# Patient Record
Sex: MALE | Race: WHITE | Employment: FULL TIME | URBAN - METROPOLITAN AREA
[De-identification: names, ages, dates, MRNs, and addresses within clinical notes are randomized per-mention and may not be internally consistent; named-entity substitution may affect disease eponyms.]

---

## 2021-11-17 ENCOUNTER — HOSPITAL ENCOUNTER (EMERGENCY)
Facility: CLINIC | Age: 31
Discharge: PSYCHIATRIC OR SUBSTANCE USE FACILITY | End: 2021-11-19
Attending: EMERGENCY MEDICINE | Admitting: EMERGENCY MEDICINE
Payer: OTHER GOVERNMENT

## 2021-11-17 DIAGNOSIS — T44.904A: ICD-10-CM

## 2021-11-17 DIAGNOSIS — R41.0 DELIRIUM: ICD-10-CM

## 2021-11-17 PROCEDURE — 90791 PSYCH DIAGNOSTIC EVALUATION: CPT

## 2021-11-17 PROCEDURE — C9803 HOPD COVID-19 SPEC COLLECT: HCPCS

## 2021-11-17 PROCEDURE — 99285 EMERGENCY DEPT VISIT HI MDM: CPT | Mod: 25

## 2021-11-18 ENCOUNTER — TELEPHONE (OUTPATIENT)
Dept: BEHAVIORAL HEALTH | Facility: CLINIC | Age: 31
End: 2021-11-18

## 2021-11-18 LAB
ALBUMIN SERPL-MCNC: 3.9 G/DL (ref 3.4–5)
ALP SERPL-CCNC: 88 U/L (ref 40–150)
ALT SERPL W P-5'-P-CCNC: 58 U/L (ref 0–70)
AMPHETAMINES UR QL SCN: ABNORMAL
ANION GAP SERPL CALCULATED.3IONS-SCNC: 6 MMOL/L (ref 3–14)
APAP SERPL-MCNC: <2 MG/L (ref 10–30)
AST SERPL W P-5'-P-CCNC: 41 U/L (ref 0–45)
BARBITURATES UR QL: ABNORMAL
BENZODIAZ UR QL: ABNORMAL
BILIRUB DIRECT SERPL-MCNC: <0.1 MG/DL (ref 0–0.2)
BILIRUB SERPL-MCNC: 0.3 MG/DL (ref 0.2–1.3)
BUN SERPL-MCNC: 17 MG/DL (ref 7–30)
CALCIUM SERPL-MCNC: 8.9 MG/DL (ref 8.5–10.1)
CANNABINOIDS UR QL SCN: ABNORMAL
CHLORIDE BLD-SCNC: 104 MMOL/L (ref 94–109)
CK SERPL-CCNC: 603 U/L (ref 30–300)
CK SERPL-CCNC: 623 U/L (ref 30–300)
CO2 SERPL-SCNC: 29 MMOL/L (ref 20–32)
COCAINE UR QL: ABNORMAL
CREAT SERPL-MCNC: 1.15 MG/DL (ref 0.66–1.25)
ERYTHROCYTE [DISTWIDTH] IN BLOOD BY AUTOMATED COUNT: 12.3 % (ref 10–15)
ETHANOL SERPL-MCNC: <0.01 G/DL
GFR SERPL CREATININE-BSD FRML MDRD: 84 ML/MIN/1.73M2
GLUCOSE BLD-MCNC: 101 MG/DL (ref 70–99)
HCT VFR BLD AUTO: 43.6 % (ref 40–53)
HGB BLD-MCNC: 14.6 G/DL (ref 13.3–17.7)
INR PPP: 0.94 (ref 0.85–1.15)
MCH RBC QN AUTO: 31.2 PG (ref 26.5–33)
MCHC RBC AUTO-ENTMCNC: 33.5 G/DL (ref 31.5–36.5)
MCV RBC AUTO: 93 FL (ref 78–100)
OPIATES UR QL SCN: ABNORMAL
PLATELET # BLD AUTO: 213 10E3/UL (ref 150–450)
POTASSIUM BLD-SCNC: 3.5 MMOL/L (ref 3.4–5.3)
PROT SERPL-MCNC: 7.2 G/DL (ref 6.8–8.8)
RBC # BLD AUTO: 4.68 10E6/UL (ref 4.4–5.9)
SALICYLATES SERPL-MCNC: <2 MG/DL
SARS-COV-2 RNA RESP QL NAA+PROBE: NEGATIVE
SODIUM SERPL-SCNC: 139 MMOL/L (ref 133–144)
WBC # BLD AUTO: 11.7 10E3/UL (ref 4–11)

## 2021-11-18 PROCEDURE — 85610 PROTHROMBIN TIME: CPT | Performed by: EMERGENCY MEDICINE

## 2021-11-18 PROCEDURE — 250N000013 HC RX MED GY IP 250 OP 250 PS 637: Performed by: EMERGENCY MEDICINE

## 2021-11-18 PROCEDURE — 250N000011 HC RX IP 250 OP 636

## 2021-11-18 PROCEDURE — 82077 ASSAY SPEC XCP UR&BREATH IA: CPT | Performed by: EMERGENCY MEDICINE

## 2021-11-18 PROCEDURE — 82550 ASSAY OF CK (CPK): CPT | Performed by: EMERGENCY MEDICINE

## 2021-11-18 PROCEDURE — 85027 COMPLETE CBC AUTOMATED: CPT | Performed by: EMERGENCY MEDICINE

## 2021-11-18 PROCEDURE — 36415 COLL VENOUS BLD VENIPUNCTURE: CPT | Performed by: EMERGENCY MEDICINE

## 2021-11-18 PROCEDURE — 80307 DRUG TEST PRSMV CHEM ANLYZR: CPT | Performed by: EMERGENCY MEDICINE

## 2021-11-18 PROCEDURE — 96374 THER/PROPH/DIAG INJ IV PUSH: CPT

## 2021-11-18 PROCEDURE — 82248 BILIRUBIN DIRECT: CPT | Performed by: EMERGENCY MEDICINE

## 2021-11-18 PROCEDURE — 87635 SARS-COV-2 COVID-19 AMP PRB: CPT | Performed by: EMERGENCY MEDICINE

## 2021-11-18 PROCEDURE — 80143 DRUG ASSAY ACETAMINOPHEN: CPT | Performed by: EMERGENCY MEDICINE

## 2021-11-18 PROCEDURE — 80179 DRUG ASSAY SALICYLATE: CPT | Performed by: EMERGENCY MEDICINE

## 2021-11-18 PROCEDURE — 80048 BASIC METABOLIC PNL TOTAL CA: CPT | Performed by: EMERGENCY MEDICINE

## 2021-11-18 RX ORDER — LORAZEPAM 2 MG/ML
2 INJECTION INTRAMUSCULAR ONCE
Status: COMPLETED | OUTPATIENT
Start: 2021-11-18 | End: 2021-11-18

## 2021-11-18 RX ORDER — OLANZAPINE 10 MG/2ML
INJECTION, POWDER, FOR SOLUTION INTRAMUSCULAR
Status: COMPLETED
Start: 2021-11-18 | End: 2021-11-19

## 2021-11-18 RX ORDER — OLANZAPINE 10 MG/2ML
10 INJECTION, POWDER, FOR SOLUTION INTRAMUSCULAR
Status: COMPLETED | OUTPATIENT
Start: 2021-11-18 | End: 2021-11-19

## 2021-11-18 RX ORDER — OLANZAPINE 5 MG/1
10 TABLET, ORALLY DISINTEGRATING ORAL 2 TIMES DAILY PRN
Status: DISCONTINUED | OUTPATIENT
Start: 2021-11-18 | End: 2021-11-19 | Stop reason: HOSPADM

## 2021-11-18 RX ORDER — ARIPIPRAZOLE 10 MG/1
10 TABLET ORAL DAILY
Status: ON HOLD | COMMUNITY
End: 2021-11-23

## 2021-11-18 RX ORDER — LORAZEPAM 2 MG/ML
INJECTION INTRAMUSCULAR
Status: COMPLETED
Start: 2021-11-18 | End: 2021-11-18

## 2021-11-18 RX ADMIN — LORAZEPAM 2 MG: 2 INJECTION INTRAMUSCULAR; INTRAVENOUS at 04:39

## 2021-11-18 RX ADMIN — OLANZAPINE 10 MG: 5 TABLET, ORALLY DISINTEGRATING ORAL at 16:34

## 2021-11-18 RX ADMIN — OLANZAPINE 15 MG: 10 TABLET, FILM COATED ORAL at 00:17

## 2021-11-18 RX ADMIN — LORAZEPAM 2 MG: 2 INJECTION INTRAMUSCULAR at 04:39

## 2021-11-18 ASSESSMENT — MIFFLIN-ST. JEOR: SCORE: 1900.19

## 2021-11-18 NOTE — TELEPHONE ENCOUNTER
S:  Pt is a 31 yr old male in the [facility] ED brought in Via EMS due to psychotic features and aggression.    B:  Info per LAURIE Sawant  :  Pt was brought to the ED via EMS after getting aggressive at a party.  He was seen taking some pills, which he said was his Adderall, then getting aggressive and pulling a knife..  He needed to be restrained and given ketamine.  In the ED he was aggressive and confused.  He was talking gibberish rapid speech.  Last night and today he has gotten threatening remains somewhat confused, disorganized., somewhat suspicious.  He did cooperate with the assessment.  He reports bipolar illness.  He states he is somewhat confused about taking his meds and has questionable compliance.    He states he has not been sleeping much and doesn't feel the need to do so.  Pt repots he doesn't remember much about last night.  He states he was in the [employer] and got out about 3 mo ago.  He says he has been having some SI but denies it currently.  Labile mood.         [facility] repots [facility]   He has elevated CK, but has been medically cleared per the ED MD.    Please see chart for further info.    Patient cleared and ready for behavioral bed placement: Yes     A:  Needing hospitalization for safety and stabilization.      [facility], [phone], spoke with Janes @ [phone], Janes will forward our request and they should CB within an hr.   3:45, Radha from the [facility] called, [phone], pt has hx there and an active care team.  They have beds and request we fax clinical to [phone]    Request was made to his RN to please check to see if he would prefer to go there.    Pt is on a 72 HR HOLD for Emergency Admission    R:  Placed on work list   Seeking appropriate placement.      [facility] reviewing

## 2021-11-18 NOTE — ED PROVIDER NOTES
[facility] ED Behavioral Health Handoff Note:       Brief HPI:  This is a 31 year old male signed out to me by Dr. Mitchell .  See initial ED Provider note for details of the presentation.     Patient is not medically cleared for admission to a Behavioral Health unit.      Pending studies: Additional labs.      Hold Status:  Active Orders   Legal    Emergency Hospitalization Hold (72 Hr Hold)     Frequency: Effective Now     Start Date/Time: 11/18/21 1128      Number of Occurrences: Until Specified    Health Officer Authority to Detain (KASIE)     Frequency: Effective Now     Start Date/Time: 11/18/21 0002      Number of Occurrences: Until Specified     Order Comments: This patient presented with an altered mental state that is suspected to be due to an intoxicating substance. The level of mentation is such that abuse of this substance is suspected. Given the patient's level of alteration and the circumstances in which the patient presented, it is likely that the patient utilizes intoxicating substances on a regular basis or has relapsed into utilizing them after a period of attempted sobriety. Given these circumstances, I am highly concerned that the patient has a problem with chemical dependency and cannot make safe decisions at this time. Currently, this endangers the patient's well-being and safety, and I am thus placing a Health Officer Authority hold upon the patient at this time.         Exam:   Temp:  [98.9  F (37.2  C)] 98.9  F (37.2  C)  Pulse:  [] 79  Resp:  [8-26] 14  BP: (150-180)/() 180/104  SpO2:  [97 %-99 %] 97 %    Slurred speech  Ambulatory independently with stead/stable gait  Agitated, reaching for security staff/ aggressive positioning noted towards nursing    ED Course:    Medications   OLANZapine zydis (zyPREXA) ODT tab 10 mg (has no administration in time range)   OLANZapine (zyPREXA) tablet 15 mg (15 mg Oral Given 11/18/21 0017)   LORazepam (ATIVAN) injection 2 mg (2 mg  "Intravenous Given 11/18/21 0439)     7:32 AM:  Code 21 called.  Nursing staff in room to draw labs.  Patient had been sleeping though awoke, and made threatening gestures towards nursing staff and attempted to exit room.  Patient was able to be verbally re-directed and went back down to sleep on [facility].  I spoke with the patient, who drifted off to sleep in conversation.  Will continue to monitor symptoms closely.    9:05 AM: Patient sleeping comfortably on gurney    11:30 AM: Patient remains sleeping    12:30 PM: Patient is becoming more awake and alert.  I did meet with the patient, and obtain collateral information.  Patient reports he had previously been in the [employer] before getting out in 2018.  Yesterday evening, he left work, planning to meet up with a friend named Jesus (former [employer]).  He reports they had a good discussion, and ultimately decided to \"have a good time.\"  Patient reports they went to Jesus's home, and Jesus acknowledged that he had a wife and children.  Patient reports that Jesus seemed frantic, trying to rapidly clean his home, and was concerned that belly felt nervous with him there.  He reports they smoked weed in the garage, the patient denies any other drug use.  He is unsure exactly of what happened the remainder of the evening, and is not sure what prompted EMS to be called.      Here in the ER, patient denies thoughts of self-harm nor suicide.  He reports his episode last evening felt similar and worse than previous manic episodes.  He reports he is on Abilify.  [facility].    Patient at this time is agreeable to redrawing laboratory studies.  Will need follow-up of his elevated CK as well as Tylenol and salicylate levels.  Patient then will require reevaluation by DEC to assist with ultimate disposition planning.    There were significant events while under my care.  (See above).    Patient was signed out to the oncoming provider, Dr. Vázquez who will " follow-up on labs and reassessment.      Impression:    ICD-10-CM    1. Delirium - likely drug induced  R41.0    2. Overdose of sympathomimetic agent, undetermined intent, initial encounter  T44.904A        Plan:    1. Pending      RESULTS:   Results for orders placed or performed during the hospital encounter of 11/17/21 (from the past 24 hour(s))   Basic metabolic panel (BMP)     Status: Abnormal    Collection Time: 11/18/21 12:18 AM   Result Value Ref Range    Sodium 139 133 - 144 mmol/L    Potassium 3.5 3.4 - 5.3 mmol/L    Chloride 104 94 - 109 mmol/L    Carbon Dioxide (CO2) 29 20 - 32 mmol/L    Anion Gap 6 3 - 14 mmol/L    Urea Nitrogen 17 7 - 30 mg/dL    Creatinine 1.15 0.66 - 1.25 mg/dL    Calcium 8.9 8.5 - 10.1 mg/dL    Glucose 101 (H) 70 - 99 mg/dL    GFR Estimate 84 >60 mL/min/1.73m2   CBC (platelets, no diff)     Status: Abnormal    Collection Time: 11/18/21 12:18 AM   Result Value Ref Range    WBC Count 11.7 (H) 4.0 - 11.0 10e3/uL    RBC Count 4.68 4.40 - 5.90 10e6/uL    Hemoglobin 14.6 13.3 - 17.7 g/dL    Hematocrit 43.6 40.0 - 53.0 %    MCV 93 78 - 100 fL    MCH 31.2 26.5 - 33.0 pg    MCHC 33.5 31.5 - 36.5 g/dL    RDW 12.3 10.0 - 15.0 %    Platelet Count 213 150 - 450 10e3/uL   CK total     Status: Abnormal    Collection Time: 11/18/21 12:18 AM   Result Value Ref Range     (H) 30 - 300 U/L   Alcohol level blood     Status: Normal    Collection Time: 11/18/21 12:18 AM   Result Value Ref Range    Alcohol ethyl <0.01 <=0.01 g/dL   Hepatic panel     Status: Normal    Collection Time: 11/18/21 12:18 AM   Result Value Ref Range    Bilirubin Total 0.3 0.2 - 1.3 mg/dL    Bilirubin Direct <0.1 0.0 - 0.2 mg/dL    Protein Total 7.2 6.8 - 8.8 g/dL    Albumin 3.9 3.4 - 5.0 g/dL    Alkaline Phosphatase 88 40 - 150 U/L    AST 41 0 - 45 U/L    ALT 58 0 - 70 U/L   Urine Drugs of Abuse Screen     Status: Abnormal    Collection Time: 11/18/21 12:54 AM    Narrative    The following orders were created for panel  order Urine Drugs of Abuse Screen.  Procedure                               Abnormality         Status                     ---------                               -----------         ------                     Drug abuse screen 1 urin...[[id number]]  Abnormal            Final result                 Please view results for these tests on the individual orders.   Drug abuse screen 1 urine (ED)     Status: Abnormal    Collection Time: 11/18/21 12:54 AM   Result Value Ref Range    Amphetamines Urine Screen Positive (A) Screen Negative    Barbiturates Urine Screen Negative Screen Negative    Benzodiazepines Urine Screen Negative Screen Negative    Cannabinoids Urine Screen Negative Screen Negative    Cocaine Urine Screen Negative Screen Negative    Opiates Urine Screen Negative Screen Negative             MD Brandon Mcneil, Tesfaye Freed MD  11/18/21 3102

## 2021-11-18 NOTE — ED NOTES
After 1000cc normal saline. Heart rate down to 100. [facility]. Pt still confused but getting better.

## 2021-11-18 NOTE — ED NOTES
"Patient up to the bathroom and started becoming increasingly agitated.  States he cannot be held here against his will and threw an object at the wall.  Dr. Lerner came to talk to patient and patient continued to be agitated.  Patient banging on the door and placed in seclusion with MD approval.  Patient moving bed around his room stating \"if you treat me like an animal I will act like an animal.\"   "

## 2021-11-18 NOTE — ED NOTES
Pt says he takes adderal and that was what he took at the party, pt says he doesn't remember much after that

## 2021-11-18 NOTE — ED NOTES
11/17/2021  Robert J Kreyer 1990       [facility] Crisis Assessment:    Started at:  1:52pm  Completed at: 2:37pm  Patient was assessed via virtually ([employer] cart or other teleconferencing device).    Chief Complaint and History of Presenting Problem:    Patient is a 31 year old  male who presented to the ED by Medics related to concerns for mental health issues. The patient was seen today by the [facility] ([facility]), through virtual crisis assessment.      The patient is calm, cooperative, alert, and oriented x3. He seems suspicious, mistrustful, and slightly confused. He demonstrates slight labile mood as he goes from irritability to tearing up. He admits feeling confusion and not understanding how he arrived in the ED. He indicates he had blacked out last night. Endorses suicidal ideation but no plan. Patient has recently not felt the need for sleep. He is uncertain if he is taking his medications as prescribed. He denies having auditory or visual hallucinations. Denies homicidal ideation. Denies use of alcohol or illicit drugs.    Patient was at a party last night and was seen taking pills. He was observed to be acting strangely which prompted them to call police. Patient was agitated and aggressive and needed to be placed in restraints. EMS needed to give him ketamine IM. He is prescribed adderal which he admits he took last night. Patient indicates he might have a history of bipolar disorder and is from [place] and  of the [employer]. He left the [employer] about 6 months ago. Medical records indicate he was released from the [place] in 2018. Patient is not considered to be an accurate historian.    Assessment and intervention involved meeting with pt, obtaining collateral information from [facility] and [facility] Everywhere records, employing crisis psychotherapy including: Establishing rapport, Active listening and Assess dimensions of crisis. Collateral information includes consulted with the ED  provider and nursing. Patient was uncertain about having DEC contact his family and indicated he didn't want to alarm them.    Biopsychosocial Background and Demographic Information    Patient is a  of the [employer] and has been out for about 6 months. He reports to have supportive parents and a sister. He was unable to provide other relevant history.    Mental Health History and Current Symptoms     Mental Health History (prior psychiatric hospitalizations, civil commitments, programmatic care, etc): He endorses previous history of bipolar disorder and at least 1 psychiatric hospitalization which occurred about 3 years ago. He is unable to provide other relevant history regarding his mental health treatments. He was confused about which medications he is taking but mentions Adderall, Depakote, Lithium, and Clonazapm. He indicates [facility] and he is having difficulty understanding or managing his medications.    Family Mental and Chemical Health History: unknown    Current and Historic Psychotropic Medications:  By patient report, Lithium, Depakote, Adderall, Clonazapam       Medication Adherent: unknown    Recent medication changes? No, he mentions he did see his psychiatric provider a few weeks ago.    Relevant Medical Concerns  Patient identifies concerns with completing ADLs? No  Patient can ambulate independently? Yes  Other medical health concerns? No  History of concussion or TBI? No     Trauma History   Physical, Emotional, or Sexual abuse: No  Loss of a friend or family member to suicide: No  Other identified traumatic event or significant stressor: No    Substance Use History and Treatments  Denies history of substance abuse or the need for treatments.    Drug screen/BAL/Breathalyzer Completed (results)?  Pos for amphetamine    History of Suicidal Ideation, Suicide Attempts, Non-Suicidal Self Injury, and Risk Formulation:   Details of current SIB, SI, or SA (Plan or Intent):  He  endorses suicidal ideation but no plan    History of SI, SA, or SIB:  Denies previous SA or SIB    Risk factors:  living alone, impulsivity/recklessness and history of or current substance use.     Protective factors:   identifies reason for living, responsibilities to others (spouse, pets, children, etc.) and identification of future goals.      ESS-6  1.a. Over the past 2 weeks, have you had thoughts of killing yourself? Yes   1.b. Have you ever attempted to kill yourself and, if yes, when did this last happen? No  2. Recent or current suicide plan? No  3. Recent or current intent to act on ideation? No  4. Lifetime psychiatric hospitalization? Yes  5. Pattern of excessive substance use? Yes  6. Current irritability, agitation, or aggression? Yes  ESS-6 Score:  Moderate Risk      Other Risk Areas  Aggressive/assumptive/homicidal risk factors: Yes: Agitation / Hyperactivity and Impaired Self Control. This was last night and earlier today in the ED.   Sexually inappropriate behavior? No      Vulnerability to sexual exploitation? No     Clinical Presentation and Current Symptoms   The patient is calm, cooperative, alert, and oriented x3. He seems suspicious, mistrustful, and slightly confused. He demonstrates slight labile mood as he goes from irritability to tearing up. He admits feeling confusion and not understanding how he arrived in the ED. He indicates he had blacked out last night. Endorses suicidal ideation but no plan. Patient has recently not felt the need for sleep. He is uncertain if he is taking his medications as prescribed. He denies having auditory or visual hallucinations. Denies homicidal ideation. Denies use of alcohol or illicit drugs.    Attention, Hyperactivity, and Impulsivity: No   Anxiety:No    Behavioral Difficulties: Yes: Agitation, Anger Problems and Hostile/Aggressive   Mood Symptoms: Yes: Crying or feels like crying, Impaired concentration, Increased irritability/agitation, Psychomotor  agitation or retardation, Risky behaviors, Sleep disturbance  and Thoughts of suicide/death    Appetite: Yes: Loss of Appetite   Feeding and Eating: No  Interpersonal Functioning: No  Learning Disabilities/Cognitive/Developmental Disorders: No   General Cognitive Impairments: No  If yes, see completed Mini-Cog Assessment below.  Sleep: Yes: Difficulty falling asleep  and Difficulty staying sleep    Psychosis: No    Trauma: No       Mental Status Exam:  Affect: Labile  Appearance: Appropriate   Attention Span/Concentration: Inattentive    Eye Contact: Intense  Fund of Knowledge: Appropriate   Language /Speech Content: Fluent  Language /Speech Volume: Normal   Language /Speech Rate/Productions: Normal   Recent Memory: Poor  Remote Memory: Variable  Mood: Anxious     Orientation:   Person: Yes   Place: No  Time of Day: No   Date: No   Situation (Do they understand why they are here?): No     Psychomotor Behavior: Normal   Thought Content: Clear  Thought Form: Intact    Current Providers and Contact Information   Patient is his own legal guardian.     Primary Care Provider: Yes, Arvin Melendrez MD  Psychiatrist: Yes, [facility]  Therapist: Yes, [facility]  : No  CTSS or ARMHS: No  ACT Team: No  Other: No    Has an SHALOM been signed? Yes ;  By: Robert Kreyer    Clinical Summary and Recommendations    Clinical summary of assessment (strengths, vulnerabilities, resources, utilization of coping skills):  Patient is recommended for admission on behavioral health unit for safety, stabilization, and further evaluation of mental health risk. He demonstrates impaired ability to insight and decision making. He is unable to safely care for himself in the community. DEC discussed the disposition plan with the ED provider and provider is in agreement with plan.      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 Patient agrees with recommended level of care.     Details of final disposition include:  Referral to inpatient behavioral health and central intake     If Inpatient, is patient admitted voluntary? No, 72 hour hold   Patient aware of potential for transfer if there is not appropriate placement? Yes  Patient is willing to travel outside of the [facility] for placement? Yes   Central Intake Notified? Yes: Date: 11/18/21 Time: 2:55pm.       Safety/after care plan, patient is unable to engage in safety planning    Duration of assessment time: .75 hrs    CPT code(s) utilized: 16917, up to 74 minutes      Gopi Kenney, LICSW

## 2021-11-18 NOTE — ED PROVIDER NOTES
History     Chief Complaint:  Mental Health Problem     History limited due to delirium  The history is provided by the patient.      Robert J Kreyer is a 31 year old male who presents with altered mental status. Patient was a party this evening when someone saw him take some pills. He then was observed to be acting strangely, prompting call for the police. When the police arrived he was put in restraints for aggressive behavior and the knife was taken. EMS gave him 250 mg of ketamine IM. At bedside he states that he took some Adderall this evening. He is a  and is from [place]. At bedside he says that he does not know who he is.     Review of Systems   Unable to perform ROS: Mental status change     Allergies:  Amoxicillin    Medications:  Fluorometholone     Past Medical History:     Acne  Migraine      Past Surgical History:    Appendectomy     Family History:    CAD  Cancer    Social History:  Patient presents via ambulance  PCP: Arvin Melendrez    Physical Exam     Patient Vitals for the past 24 hrs:   BP Temp Temp src Pulse Resp SpO2 Height Weight   11/18/21 0145 -- -- -- 79 14 -- -- --   11/18/21 0130 -- -- -- 96 16 -- -- --   11/18/21 0115 -- -- -- 86 18 -- -- --   11/18/21 0100 -- -- -- 106 10 -- -- --   11/18/21 0045 -- -- -- 102 26 -- -- --   11/18/21 0035 -- -- -- 117 -- -- -- --   11/18/21 0030  180/104 -- -- 113 19 -- -- --   11/18/21 0020 -- -- --  121 12 -- -- --   11/18/21 0015  164/105 -- -- 117 11 -- -- --   11/18/21 0010 -- -- -- 117 21 -- -- --   11/18/21 0008  160/93 98.9  F (37.2  C) Oral 120 22 97 % 1.829 m (6') 90.7 kg (200 lb)   11/18/21 0000  150/90 -- -- 128 8 99 % -- --       Physical Exam  Nursing note and vitals reviewed.  Constitutional: In restraints.  HENT:   Mouth/Throat: Mucous membranes are normal.   Eyes: Pupils are equal, round, and reactive to light. Injected with horizontal nystagmus noted.  Cardiovascular: Tachycardic, regular rhythm and normal heart sounds.  No  murmur.  Pulmonary/Chest: Effort normal and breath sounds normal. No respiratory distress. No wheezes. No rales.   Abdominal: Soft. Normal appearance. No distension. There is no tenderness.   Neurological: Alert. Strength normal and symmetric.   Skin: Skin is warm and dry.    Psychiatric: Tangential and difficult to redirect. Appears to be responding to internal stimuli    Emergency Department Course   Laboratory:  Labs Ordered and Resulted from Time of ED Arrival to Time of ED Departure   BASIC METABOLIC PANEL - Abnormal       Result Value    Sodium 139      Potassium 3.5      Chloride 104      Carbon Dioxide (CO2) 29      Anion Gap 6      Urea Nitrogen 17      Creatinine 1.15      Calcium 8.9      Glucose 101 (*)     GFR Estimate 84     CBC WITH PLATELETS - Abnormal    WBC Count 11.7 (*)     RBC Count 4.68      Hemoglobin 14.6      Hematocrit 43.6      MCV 93      MCH 31.2      MCHC 33.5      RDW 12.3      Platelet Count 213     CK TOTAL - Abnormal     (*)    DRUG ABUSE SCREEN 1 URINE (ED) - Abnormal    Amphetamines Urine Screen Positive (*)     Barbiturates Urine Screen Negative      Benzodiazepines Urine Screen Negative      Cannabinoids Urine Screen Negative      Cocaine Urine Screen Negative      Opiates Urine Screen Negative     ETHYL ALCOHOL LEVEL - Normal    Alcohol ethyl <0.01       Reviewed:  I reviewed nursing notes, vitals, past medical history and Care Everywhere    Assessments/;Consults:  ED Course as of 11/18/21 0522   Wed Nov 17, 2021   5832 Obtained history and examined the patient as noted above   u Nov 18, 2021   0152 Rechecked the patient. He is asleep and will be taken out of restraints   0435 Code 21. The patient had escalated and tried to leave the room. On arrival the patient was able to be verbally deescalated. He did endorse hearing voices during this time.     Interventions:  0017 Olanzapine 15 mg, Oral   0439 Ativan 2 mg, IV    Disposition:  The patient was discharged to home  pending metabolization of his intoxicant and reassessment.     Impression & Plan     Medical Decision Making:  Robert J Kreyer is 31 year old gentleman who presents with delirium and a toxidrome that is consistent with a sympathomimetic drug. Drug screen is postiive for amphetamines which would fit this clinical scenario. His eyes are dilated, he is sweaty and tachycardic and clearly delirious. His history was complicated by his probable ingestion as well as the ketamine that was administered prior to arrival at the hospital. After zyprexa he has been sleeping and resting comfortably. He is now out of restraints. Plan of care is for reassessment in the morning once he has had a chance to metabolize his intoxicants.     Diagnosis:    ICD-10-CM    1. Delirium - likely drug induced  R41.0    2. Overdose of sympathomimetic agent, undetermined intent, initial encounter  T44.904A      Scribe Disclosure:  JAMES, Scott Lewis, am serving as a scribe at 11:52 PM on 11/17/2021 to document services personally performed by Agusto Mitchell MD based on my observations and the provider's statements to me.            Agusto Mitchell MD  11/18/21 9404

## 2021-11-18 NOTE — ED NOTES
Patient woke, stating he needed to pee. Patient banging on the door. Yelling at security and attempting to force door open. Patient continues to not be redirectable.

## 2021-11-18 NOTE — ED NOTES
Restraints removed per Dr. Mitchell at 0153. Pt calm and cooperative at this time, agrees to meet criteria to remain out of restraints. Ambulated to bathroom with out incident.

## 2021-11-18 NOTE — PHARMACY-ADMISSION MEDICATION HISTORY
Admission medication history interview status for this patient is complete. See [facility] admission navigator for allergy information, prior to admission medications and immunization status.     Medication history interview done, indicate source(s): Patient  Medication history resources (including written lists, pill bottles, clinic record):[facility]  [facility]    Med rec completed to the best of this writer's ability. Patient was able to have conversation with me but could not remember the names of the [facility]s that [facility].  He did say that he is taking Adderall XR 60 mg but it is not prescribed to him- he is getting from a friend so I am unable to verify if that is accurate or not.    Changes made to PTA medication list:  Added: all meds  Changed:   Reported as Not Taking:   Removed: FML     Actions taken by pharmacist (provider contacted, etc):sticky note- talked to RN     Additional medication history information:None    Medication reconciliation/reorder completed by provider prior to medication history?  N   (Y/N)         Prior to Admission medications    Medication Sig Last Dose Taking? Auth Provider   Amphetamine-Dextroamphetamine (AMPHETAMINE-DEXTROAMPHET ER PO) Take 60 mg by mouth every morning ? Yes Unknown, Entered By History   ARIPiprazole (ABILIFY) 10 MG tablet Take 10 mg by mouth daily ? Yes Unknown, Entered By History   Cholecalciferol (VITAMIN D3 PO) Take by mouth daily  Yes Unknown, Entered By History   Pediatric Multivit-Minerals-C (RA GUMMY VITAMINS & MINERALS PO) Take 1 tablet by mouth daily  Yes Unknown, Entered By History
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
## 2021-11-18 NOTE — SAFE
Yaron DERAS Yaaluz maria  November 18, 2021  SAFE Note    Critical Safety Issues:  Patient was agitated and aggressive last night and in the ED. He continues to be slightly disorganized with speech. He continues to demonstrate intermittent rambling. He demonstrates slight labile mood as he goes from irritability to tearing up. He admits feeling confusion and not understanding how he arrived in the ED. Endorses suicidal ideation but no plan.      Current Suicidal Ideation/Self-Injurious Concerns/Methods: Other Endorses suicidal ideation but no plan or intent.      Current or Historical Inappropriate Sexual Behavior: No      Current or Historical Aggression/Homicidal Ideation: None - N/A      Triggers:  Patient has recently not felt the need for sleep. He is uncertain if he is taking his medications as prescribed.     Updated care team: Yes: consulted with ED provider and central intake    For additional details see full LMHP assessment.       DUKE NewsomeSW

## 2021-11-18 NOTE — ED NOTES
Called code 21 pt aggressively trying to get out of room, admits to hearing voices. Pt had foot in doorway and wouldn't get back in room .. given ativan threw iv, everything has been removed from room

## 2021-11-18 NOTE — ED NOTES
Security was brought in to assist as pt was at door and seemingly agitated after pulling out his own IV. Pt was able to be descalated and became cooperative. Pt allowed nurse to take blood. Food was ordered and eaten. Pt spoke with doctor calmly. Pt agrees to remain calm and seclusion restraint lifted.

## 2021-11-18 NOTE — ED NOTES
Attempted to wake patient for a blood draw. When patient woke up, after a few seconds I explained to the patient who I was and why I was here. I started to attempt to draw blood and patient grabbed my hand. I was able to pull out of this quickly. His eyes were wide open and looked frantic. I explained to patient why I was here again and he seemed to not listen to any of it. He balled up his other fist got up and was posturing. I moved to the door with security to talk to him. He continued posturing, rambling about this that were not mention. Patient was not able to be redirected. He was hitting the door frame. I Was able to get the patient back to bed when I notified him that I would get the doctor to talk to him. He took a few steps back as if he was going to cooperate then came back forward and slammed into the the door barring it open. Patient was again threatening the safety of staff and help had not arrived yet. At this time he started coming at staff when a taser was presented by security and was mandated to step back and get into bed. At this time staff had arrived for protection. MD talked with patient. Patient fell asleep while being talked to. Seclusion order given at this time for safety. Unable to get labs at this time

## 2021-11-19 ENCOUNTER — HOSPITAL ENCOUNTER (INPATIENT)
Facility: CLINIC | Age: 31
LOS: 4 days | Discharge: HOME | DRG: 885 | End: 2021-11-23
Attending: PSYCHIATRY & NEUROLOGY | Admitting: PSYCHIATRY & NEUROLOGY
Payer: OTHER GOVERNMENT

## 2021-11-19 VITALS
SYSTOLIC BLOOD PRESSURE: 129 MMHG | TEMPERATURE: 98.9 F | OXYGEN SATURATION: 98 % | BODY MASS INDEX: 27.09 KG/M2 | DIASTOLIC BLOOD PRESSURE: 84 MMHG | RESPIRATION RATE: 18 BRPM | HEIGHT: 72 IN | HEART RATE: 88 BPM | WEIGHT: 200 LBS

## 2021-11-19 DIAGNOSIS — F23 BRIEF PSYCHOTIC DISORDER (H): Primary | ICD-10-CM

## 2021-11-19 DIAGNOSIS — H04.123 DRY EYES: ICD-10-CM

## 2021-11-19 PROBLEM — F29 PSYCHOSIS (H): Status: ACTIVE | Noted: 2021-11-19

## 2021-11-19 PROCEDURE — 96372 THER/PROPH/DIAG INJ SC/IM: CPT | Mod: 59

## 2021-11-19 PROCEDURE — 250N000013 HC RX MED GY IP 250 OP 250 PS 637: Performed by: EMERGENCY MEDICINE

## 2021-11-19 PROCEDURE — 128N000001 HC R&B CD/MH ADULT

## 2021-11-19 PROCEDURE — 250N000011 HC RX IP 250 OP 636

## 2021-11-19 RX ORDER — OLANZAPINE 10 MG/2ML
10 INJECTION, POWDER, FOR SOLUTION INTRAMUSCULAR 3 TIMES DAILY PRN
Status: DISCONTINUED | OUTPATIENT
Start: 2021-11-19 | End: 2021-11-23 | Stop reason: HOSPADM

## 2021-11-19 RX ORDER — OLANZAPINE 10 MG/2ML
10 INJECTION, POWDER, FOR SOLUTION INTRAMUSCULAR ONCE
Status: DISCONTINUED | OUTPATIENT
Start: 2021-11-19 | End: 2021-11-19 | Stop reason: HOSPADM

## 2021-11-19 RX ORDER — ACETAMINOPHEN 325 MG/1
650 TABLET ORAL EVERY 4 HOURS PRN
Status: DISCONTINUED | OUTPATIENT
Start: 2021-11-19 | End: 2021-11-23 | Stop reason: HOSPADM

## 2021-11-19 RX ORDER — AMOXICILLIN 250 MG
1 CAPSULE ORAL 2 TIMES DAILY PRN
Status: DISCONTINUED | OUTPATIENT
Start: 2021-11-19 | End: 2021-11-23 | Stop reason: HOSPADM

## 2021-11-19 RX ORDER — OLANZAPINE 10 MG/1
10 TABLET ORAL 3 TIMES DAILY PRN
Status: DISCONTINUED | OUTPATIENT
Start: 2021-11-19 | End: 2021-11-23 | Stop reason: HOSPADM

## 2021-11-19 RX ORDER — ARIPIPRAZOLE 10 MG/1
10 TABLET ORAL DAILY
Status: DISCONTINUED | OUTPATIENT
Start: 2021-11-19 | End: 2021-11-19 | Stop reason: HOSPADM

## 2021-11-19 RX ORDER — MAGNESIUM HYDROXIDE/ALUMINUM HYDROXICE/SIMETHICONE 120; 1200; 1200 MG/30ML; MG/30ML; MG/30ML
30 SUSPENSION ORAL EVERY 4 HOURS PRN
Status: DISCONTINUED | OUTPATIENT
Start: 2021-11-19 | End: 2021-11-23 | Stop reason: HOSPADM

## 2021-11-19 RX ORDER — HYDROXYZINE HYDROCHLORIDE 25 MG/1
25 TABLET, FILM COATED ORAL EVERY 4 HOURS PRN
Status: DISCONTINUED | OUTPATIENT
Start: 2021-11-19 | End: 2021-11-23 | Stop reason: HOSPADM

## 2021-11-19 RX ORDER — TRAZODONE HYDROCHLORIDE 50 MG/1
50 TABLET, FILM COATED ORAL
Status: DISCONTINUED | OUTPATIENT
Start: 2021-11-19 | End: 2021-11-23 | Stop reason: HOSPADM

## 2021-11-19 RX ADMIN — OLANZAPINE 10 MG: 10 INJECTION, POWDER, FOR SOLUTION INTRAMUSCULAR at 00:47

## 2021-11-19 RX ADMIN — OLANZAPINE 10 MG: 5 TABLET, ORALLY DISINTEGRATING ORAL at 19:01

## 2021-11-19 RX ADMIN — ARIPIPRAZOLE 10 MG: 10 TABLET ORAL at 09:36

## 2021-11-19 ASSESSMENT — ACTIVITIES OF DAILY LIVING (ADL)
DIFFICULTY_COMMUNICATING: NO
HYGIENE/GROOMING: INDEPENDENT
DRESS: SCRUBS (BEHAVIORAL HEALTH)
FALL_HISTORY_WITHIN_LAST_SIX_MONTHS: NO
WEAR_GLASSES_OR_BLIND: NO
ORAL_HYGIENE: INDEPENDENT
HEARING_DIFFICULTY_OR_DEAF: NO
DIFFICULTY_EATING/SWALLOWING: NO
TOILETING_ISSUES: NO
DOING_ERRANDS_INDEPENDENTLY_DIFFICULTY: YES
WALKING_OR_CLIMBING_STAIRS_DIFFICULTY: NO
DRESSING/BATHING_DIFFICULTY: NO
PATIENT_/_FAMILY_COMMUNICATION_STYLE: SPOKEN LANGUAGE (ENGLISH OR BILINGUAL)
CONCENTRATING,_REMEMBERING_OR_MAKING_DECISIONS_DIFFICULTY: NO

## 2021-11-19 NOTE — ED PROVIDER NOTES
[facility] ED Behavioral Health Handoff Note:       Brief HPI:  This is a 31 year old male signed out to me by Dr. Hart .  See initial ED Provider note for details of the presentation.     Patient is medically cleared for admission to a Behavioral Health unit.      Pending studies:NA.      Hold Status:  Active Orders   Legal    Emergency Hospitalization Hold (72 Hr Hold)     Frequency: Effective Now     Start Date/Time: 11/18/21 1128      Number of Occurrences: Until Specified    Health Officer Authority to Detain (KASIE)     Frequency: Effective Now     Start Date/Time: 11/18/21 0002      Number of Occurrences: Until Specified     Order Comments: This patient presented with an altered mental state that is suspected to be due to an intoxicating substance. The level of mentation is such that abuse of this substance is suspected. Given the patient's level of alteration and the circumstances in which the patient presented, it is likely that the patient utilizes intoxicating substances on a regular basis or has relapsed into utilizing them after a period of attempted sobriety. Given these circumstances, I am highly concerned that the patient has a problem with chemical dependency and cannot make safe decisions at this time. Currently, this endangers the patient's well-being and safety, and I am thus placing a Health Officer Authority hold upon the patient at this time.         The patient has required medication for agitation.    The patient's home medications have been reviewed and ordered/administered.    Exam:   Pulse:  [82] 82  Resp:  [16] 16  BP: (141)/(79) 141/79  SpO2:  [98 %] 98 %    Intermittently agitated  Ambulatory independently to the bathroom    ED Course:    Medications   OLANZapine zydis (zyPREXA) ODT tab 10 mg (10 mg Oral Given 11/18/21 1634)   ARIPiprazole (ABILIFY) tablet 10 mg (has no administration in time range)   OLANZapine (zyPREXA) injection 10 mg (has no administration in time range)    OLANZapine (zyPREXA) tablet 15 mg (15 mg Oral Given 11/18/21 0017)   LORazepam (ATIVAN) injection 2 mg (2 mg Intravenous Given 11/18/21 0439)   OLANZapine (zyPREXA) injection 10 mg (10 mg Intramuscular Given 11/19/21 0047)       There were no significant events while under my care.      Reached out to Dr. Jin from psychiatry regarding patient's clinical stay.  Awaiting recommendations.    Patient was signed out to the oncoming provider. Dr. Crain    Impression:    ICD-10-CM    1. Delirium - likely drug induced  R41.0    2. Overdose of sympathomimetic agent, undetermined intent, initial encounter  T44.904A        Plan:    1. Await Transfer to Mental Health Facility      RESULTS:   Results for orders placed or performed during the hospital encounter of 11/17/21 (from the past 24 hour(s))   Salicylate level     Status: Normal    Collection Time: 11/18/21  1:02 PM   Result Value Ref Range    Salicylate <2 <20 mg/dL   Acetaminophen level     Status: Abnormal    Collection Time: 11/18/21  1:02 PM   Result Value Ref Range    Acetaminophen <2 (L) 10 - 30 mg/L   INR     Status: Normal    Collection Time: 11/18/21  1:02 PM   Result Value Ref Range    INR 0.94 0.85 - 1.15   CK total     Status: Abnormal    Collection Time: 11/18/21  1:02 PM   Result Value Ref Range     (H) 30 - 300 U/L   Asymptomatic COVID-19 Virus (Coronavirus) by PCR Nasopharyngeal     Status: Normal    Collection Time: 11/18/21  4:41 PM    Specimen: Nasopharyngeal; Swab   Result Value Ref Range    SARS CoV2 PCR Negative Negative    Narrative    Testing was performed using the sandrine  SARS-CoV-2 & Influenza A/B Assay on the sandrine  Medina  System.  This test should be ordered for the detection of SARS-COV-2 in individuals who meet SARS-CoV-2 clinical and/or epidemiological criteria. Test performance is unknown in asymptomatic patients.  This test is for in vitro diagnostic use under the FDA EUA for laboratories certified under CLIA to perform moderate  and/or high complexity testing. This test has not been FDA cleared or approved.  A negative test does not rule out the presence of PCR inhibitors in the specimen or target RNA in concentration below the limit of detection for the assay. The possibility of a false negative should be considered if the patient's recent exposure or clinical presentation suggests COVID-19.  [facility] are certified under the Clinical Laboratory Improvement Amendments of 1988 (CLIA-88) as qualified to perform moderate and/or high complexity laboratory testing.             MD Brandon Mcneil, Tesfaye Freed MD  11/19/21 1300

## 2021-11-19 NOTE — ED NOTES
"Pt came out, wanted to \"go for a walk\". Pt kept trying to walk past security. Security had to hold patient, Code 21 called. Patient walked himself back to his room.   "

## 2021-11-19 NOTE — ED NOTES
Pt in seclusion upon arrival due to agitation and violent behavior towards staff. Pt being watched by security. Pt seen laying in bed, moving head.

## 2021-11-19 NOTE — ED PROVIDER NOTES
[facility] ED Mental Health Handoff Note:       Brief HPI:  This is a 31 year old male signed out to me by Dr. Taylor via Dr. Vázquez.  See initial ED Provider note for full details of the presentation.  Plan at time of signout was awaiting evaluation by DEC/     Home meds reviewed and ordered/administered: No    Medically stable for inpatient mental health admission: Yes.    Evaluated by mental health: No. Patient is clinically sober and awaiting evaluation for disposition.    Safety concerns: At the time I received sign out, the patient had been aggressive/combative/agitated, but has calmed.    Hold Status:  Active Orders   Legal    Emergency Hospitalization Hold (72 Hr Hold)     Frequency: Effective Now     Start Date/Time: 11/18/21 1128      Number of Occurrences: Until Specified    Health Officer Authority to Detain (KASIE)     Frequency: Effective Now     Start Date/Time: 11/18/21 0002      Number of Occurrences: Until Specified     Order Comments: This patient presented with an altered mental state that is suspected to be due to an intoxicating substance. The level of mentation is such that abuse of this substance is suspected. Given the patient's level of alteration and the circumstances in which the patient presented, it is likely that the patient utilizes intoxicating substances on a regular basis or has relapsed into utilizing them after a period of attempted sobriety. Given these circumstances, I am highly concerned that the patient has a problem with chemical dependency and cannot make safe decisions at this time. Currently, this endangers the patient's well-being and safety, and I am thus placing a Health Officer Authority hold upon the patient at this time.         Exam:   Patient Vitals for the past 24 hrs:   BP Temp Temp src Pulse Resp SpO2 Height Weight   11/18/21 1443 (!) 141/79 -- -- 82 16 98 % -- --   11/18/21 0145 -- -- -- 79 14 -- -- --   11/18/21 0130 -- -- -- 96 16 -- -- --   11/18/21  0115 -- -- -- 86 18 -- -- --   11/18/21 0100 -- -- -- 106 10 -- -- --   11/18/21 0045 -- -- -- 102 26 -- -- --   11/18/21 0035 -- -- -- 117 -- -- -- --   11/18/21 0030 (!) 180/104 -- -- 113 19 -- -- --   11/18/21 0020 -- -- -- (!) 121 12 -- -- --   11/18/21 0015 (!) 164/105 -- -- 117 11 -- -- --   11/18/21 0010 -- -- -- 117 21 -- -- --   11/18/21 0008 (!) 160/93 98.9  F (37.2  C) Oral 120 22 97 % 1.829 m (6') 90.7 kg (200 lb)   11/18/21 0000 (!) 150/90 -- -- (!) 128 8 99 % -- --         ED Course:    Medications   OLANZapine zydis (zyPREXA) ODT tab 10 mg (10 mg Oral Given 11/18/21 1634)   OLANZapine (zyPREXA) injection 10 mg (has no administration in time range)   OLANZapine (zyPREXA) 10 MG injection (has no administration in time range)   OLANZapine (zyPREXA) tablet 15 mg (15 mg Oral Given 11/18/21 0017)   LORazepam (ATIVAN) injection 2 mg (2 mg Intravenous Given 11/18/21 0439)       ED Course as of 11/18/21 3703   Wed Nov 17, 2021   1511 Obtained history and examined the patient as noted above   Thu Nov 18, 2021   0152 Rechecked the patient. He is asleep and will be taken out of restraints   0435 Code 21. The patient had escalated and tried to leave the room. On arrival the patient was able to be verbally deescalated. He did endorse hearing voices during this time.       There were significant events during my shift.  He became agitated and required seclusion for short time but was agreeable to taking oral Zyprexa and calm down and had no further issues on my shift.  He was evaluated by DEC, who is endorsing inpatient admission.  He is already on a 72-hour hold.  We are attempting to find placement at this time with bed search ongoing.    Patient was signed out to the oncoming provider, Dr. Hart.       Impression:    ICD-10-CM    1. Delirium - likely drug induced  R41.0    2. Overdose of sympathomimetic agent, undetermined intent, initial encounter  T44.904A        Plan:    1. Awaiting inpatient mental health  admission/transfer.    MD Eduarda Galeas Nicholas J, MD  11/18/21 4544

## 2021-11-19 NOTE — ED NOTES
Patient came to the door, RN introduced herself to the patient. Patient asking how long he has to stay here. RN explained plan of care to patient. Patient remained calm, cooperative. RN provided lunch and water to the patient.

## 2021-11-19 NOTE — ED NOTES
Seclusion discontinued, pt sleeping, MD notified, and ok with discontinuation if security feels it to be safe.

## 2021-11-19 NOTE — CONSULTS
Consult Date: 2021    PHONE CONTACT/PATIENT NONCONTACT NOTE    Contacted by the ED physician, Dr. Lerner, regarding Mr. Kreyer, who was admitted in an acute psychotic state, was evaluated by DEC and is awaiting an acute psychiatric bed.  He has been placed on a 72-hour hold.  He was restarted on his ambulatory medication Abilify 10 mg daily today, but he did require IM Zyprexa 10 mg for agitation.  I have been asked to advise regarding possible p.r.n. medications.  His urine tox screen was positive for amphetamine metabolites, but he is noted also to be on prescription amphetamines.  His blood pressure and vitals have been stable.    RECOMMENDATION:  For lorazepam 1-2 mg every 6 hours p.r.n. IM/p.o. or IV.  He likely would have better coverage from Zyprexa 5 mg and can begin 1 tablet t.i.d. p.r.n. also in addition to his usual daily Abilify.  Please call with questions and concerns.  He is being closely monitored in the ED on Observation status until transfer to Psychiatry.    Miriam Jin MD        D: 2021   T: 2021   MT: LEELEE/TALQA    Name:     KREYER, ROBERT J.  MRN:      [id number]        Account:      [id number]   :      1990           Consult Date: 2021     Document: [id number]

## 2021-11-19 NOTE — ED NOTES
Pt woke, asked for urinal and tried to get out of room. Code green called and zyprexa given in right thigh.

## 2021-11-19 NOTE — ED NOTES
"Pt provided oral care supplies and commode. Pt up in room walking around asking to go to the bathroom. Pt reminded we need him to stay in his room for safety. Pt talking fast, seeming paranoid with statements such as \"for me to trust you, will you let me go to the bathroom.\",  moving bed around in room as evidence of lowering and lifting bed and moving it into the middle of the room and back to corner. Pt willing to take medications (see MAR), pt asked for lights off, pt went back to bed.    "

## 2021-11-19 NOTE — ED NOTES
Pt up to door as he continues to be in seclusion, requesting urinal. Patient stated that he would continue to make staff as uncomfortable as possible as he is uncomfortable, until he is released. Patient does not recall being aggressive with staff.

## 2021-11-19 NOTE — TELEPHONE ENCOUNTER
R: Patient in [facility] ER awaiting placement  Tent 55c/[facility]  4:45pm: Red accepts patient to 55C/[facility]  5:20pm: Intake informed unit charge nurse of patient placement and report time, Unit charge will call [facility] er for report. Intake informed

## 2021-11-19 NOTE — ED PROVIDER NOTES
[facility] ED Mental Health Handoff Note:       Brief HPI:  This is a 31 year old male signed out to me by Dr. Lerner.  See initial ED Provider note for full details of the presentation. Patient seen by DEC who recommended admission.     Home meds reviewed and ordered/administered: Yes    Medically stable for inpatient mental health admission: Yes.    Evaluated by mental health: Yes. The recommendation is for inpatient mental health treatment. Bed search in process    Safety concerns: At the time I received sign out, the patient had been aggressive/combative/agitated, but has calmed.    Hold Status:  Active Orders   Legal    Emergency Hospitalization Hold (72 Hr Hold)     Frequency: Effective Now     Start Date/Time: 11/18/21 1128      Number of Occurrences: Until Specified    Health Officer Authority to Detain (KASIE)     Frequency: Effective Now     Start Date/Time: 11/18/21 0002      Number of Occurrences: Until Specified     Order Comments: This patient presented with an altered mental state that is suspected to be due to an intoxicating substance. The level of mentation is such that abuse of this substance is suspected. Given the patient's level of alteration and the circumstances in which the patient presented, it is likely that the patient utilizes intoxicating substances on a regular basis or has relapsed into utilizing them after a period of attempted sobriety. Given these circumstances, I am highly concerned that the patient has a problem with chemical dependency and cannot make safe decisions at this time. Currently, this endangers the patient's well-being and safety, and I am thus placing a Health Officer Authority hold upon the patient at this time.         Exam:   Patient Vitals for the past 24 hrs:   BP Pulse Resp SpO2   11/18/21 1443 (!) 141/79 82 16 98 %   11/18/21 0145 -- 79 14 --   11/18/21 0130 -- 96 16 --   11/18/21 0115 -- 86 18 --   11/18/21 0100 -- 106 10 --   11/18/21 0045 -- 102 26 --        ED Course:    Medications   OLANZapine zydis (zyPREXA) ODT tab 10 mg (10 mg Oral Given 11/18/21 1634)   ARIPiprazole (ABILIFY) tablet 10 mg (has no administration in time range)   OLANZapine (zyPREXA) injection 10 mg (has no administration in time range)   OLANZapine (zyPREXA) tablet 15 mg (15 mg Oral Given 11/18/21 0017)   LORazepam (ATIVAN) injection 2 mg (2 mg Intravenous Given 11/18/21 0439)   OLANZapine (zyPREXA) injection 10 mg (10 mg Intramuscular Given 11/19/21 0047)       ED Course as of 11/19/21 0040   Wed Nov 17, 2021   3843 Obtained history and examined the patient as noted above   Thu Nov 18, 2021   0152 Rechecked the patient. He is asleep and will be taken out of restraints   0435 Code 21. The patient had escalated and tried to leave the room. On arrival the patient was able to be verbally deescalated. He did endorse hearing voices during this time.     12:40 AM  Patient becoming increasingly agitated, threw urinal at staff.  10 IM zyprexa given.     1:45AM  Patient sleeping    Patient was signed out to the oncoming provider, Dr. Taylor      Impression:    ICD-10-CM    1. Delirium - likely drug induced  R41.0    2. Overdose of sympathomimetic agent, undetermined intent, initial encounter  T44.904A        Plan:    1. Awaiting inpatient mental health admission/transfer.      RESULTS:   Results for orders placed or performed during the hospital encounter of 11/17/21 (from the past 24 hour(s))   Urine Drugs of Abuse Screen     Status: Abnormal    Collection Time: 11/18/21 12:54 AM    Narrative    The following orders were created for panel order Urine Drugs of Abuse Screen.  Procedure                               Abnormality         Status                     ---------                               -----------         ------                     Drug abuse screen 1 urin...[[id number]]  Abnormal            Final result                 Please view results for these tests on the individual orders.   Drug  abuse screen 1 urine (ED)     Status: Abnormal    Collection Time: 11/18/21 12:54 AM   Result Value Ref Range    Amphetamines Urine Screen Positive (A) Screen Negative    Barbiturates Urine Screen Negative Screen Negative    Benzodiazepines Urine Screen Negative Screen Negative    Cannabinoids Urine Screen Negative Screen Negative    Cocaine Urine Screen Negative Screen Negative    Opiates Urine Screen Negative Screen Negative   Salicylate level     Status: Normal    Collection Time: 11/18/21  1:02 PM   Result Value Ref Range    Salicylate <2 <20 mg/dL   Acetaminophen level     Status: Abnormal    Collection Time: 11/18/21  1:02 PM   Result Value Ref Range    Acetaminophen <2 (L) 10 - 30 mg/L   INR     Status: Normal    Collection Time: 11/18/21  1:02 PM   Result Value Ref Range    INR 0.94 0.85 - 1.15   CK total     Status: Abnormal    Collection Time: 11/18/21  1:02 PM   Result Value Ref Range     (H) 30 - 300 U/L   Asymptomatic COVID-19 Virus (Coronavirus) by PCR Nasopharyngeal     Status: Normal    Collection Time: 11/18/21  4:41 PM    Specimen: Nasopharyngeal; Swab   Result Value Ref Range    SARS CoV2 PCR Negative Negative    Narrative    Testing was performed using the sandrine  SARS-CoV-2 & Influenza A/B Assay on the sandrine  [facility]ina  System.  This test should be ordered for the detection of SARS-COV-2 in individuals who meet SARS-CoV-2 clinical and/or epidemiological criteria. Test performance is unknown in asymptomatic patients.  This test is for in vitro diagnostic use under the FDA EUA for laboratories certified under CLIA to perform moderate and/or high complexity testing. This test has not been FDA cleared or approved.  A negative test does not rule out the presence of PCR inhibitors in the specimen or target RNA in concentration below the limit of detection for the assay. The possibility of a false negative should be considered if the patient's recent exposure or clinical presentation suggests COVID-19.   [facility] are certified under the Clinical Laboratory Improvement Amendments of 1988 (CLIA-88) as qualified to perform moderate and/or high complexity laboratory testing.             DO Tanner Zamarripa Lindsey E, DO  11/19/21 0447

## 2021-11-19 NOTE — ED NOTES
"Patient came to door, stated that he needed to use the bathroom (for the 4th time in the last hour). Pt got half way to the bathroom and stopped, looked back at security and stated, \"What would you do if I tried to leave?' Security stated, \" I would have to stop you.\" Pt pointed to security guards tasjason and stated, \" Would you shoot me with that thing?\"  Security stated, \"We don't want to have to do that because that would delay this process of you getting out of here.\" Pt demeanor calm. Patient given new sheets and blankets. Re-directed back to bed.   "

## 2021-11-19 NOTE — ED NOTES
"Pt given breakfast, lights turned on. ABC's intact. Pt asked about his Abilify, pt stated \"hes not sure right now.\" RN will check in after breakfast.   "

## 2021-11-19 NOTE — TELEPHONE ENCOUNTER
Update: pt declined by the [facility] due to pt being too acute for the beds they have available.  [facility] is at capacity.  Discussed pt with on call provider for [facility] who feels pt is too acute for [facility] at this time.  ED notified.

## 2021-11-20 PROCEDURE — 128N000001 HC R&B CD/MH ADULT

## 2021-11-20 PROCEDURE — 99223 1ST HOSP IP/OBS HIGH 75: CPT | Performed by: NURSE PRACTITIONER

## 2021-11-20 PROCEDURE — 250N000013 HC RX MED GY IP 250 OP 250 PS 637: Performed by: NURSE PRACTITIONER

## 2021-11-20 PROCEDURE — 250N000013 HC RX MED GY IP 250 OP 250 PS 637: Performed by: PSYCHIATRY & NEUROLOGY

## 2021-11-20 RX ORDER — ARIPIPRAZOLE 5 MG/1
5 TABLET ORAL DAILY
Status: DISCONTINUED | OUTPATIENT
Start: 2021-11-20 | End: 2021-11-21

## 2021-11-20 RX ADMIN — GUAIFENESIN 10 ML: 200 SOLUTION ORAL at 20:38

## 2021-11-20 RX ADMIN — TRAZODONE HYDROCHLORIDE 50 MG: 50 TABLET ORAL at 01:45

## 2021-11-20 RX ADMIN — ARIPIPRAZOLE 5 MG: 5 TABLET ORAL at 13:39

## 2021-11-20 RX ADMIN — GUAIFENESIN 10 ML: 200 SOLUTION ORAL at 01:45

## 2021-11-20 ASSESSMENT — ACTIVITIES OF DAILY LIVING (ADL)
HYGIENE/GROOMING: SHOWER;INDEPENDENT
DRESS: SCRUBS (BEHAVIORAL HEALTH)
ORAL_HYGIENE: INDEPENDENT

## 2021-11-20 NOTE — PROGRESS NOTES
"Pt arrived to unit via EMS from [facility]. Pt arrives to ED after he was at a party and was observed taking pills. Pt's became aggressive and pulled out a knife. 911 was called. In the ED, pt has altered mental status, is disorganizes, labile, and speaking gibberish. Pt is given Ativan IV x2 for trying elope from ED. Pt also placed in seclusion. Pt is later calm and cooperative and constantly requesting food.    Upon arrival to unit, pt is cooperative with vital signs, skin check, and changes into scrubs. Pt is tense, cutting writer off when describing the unit stating, \"I know the drill\". Medications reviewed with patient (see PTA med list). Pt is requesting Seroquel for sleep and Robitussin for a sore throat. Pt denies all psych symptoms and contracts for safety at this time. Pt denies any pain and is alert and oriented x4.    Per pt's friend, Lang, pt was discharged from the [employer], six months ago, for \"psych\". Pt has a history of Bipolar disorder.    Current medical concerns include a sore throat and elevated CK level of 623. Pt is medically cleared at [facility] ED.     Pt voices no other concerns at this time.Pt is given crackers and agrees to sign security envelope. Pt refuses to answer further questions this evening and requests to sleep.  Pt is started on suicidal, elopement, assault, and cheeking precautions. Staff will continue to monitor.    Lila Alves RN          "

## 2021-11-20 NOTE — H&P
"PSYCHIATRY   HISTORY AND PHYSICAL     DATE OF SERVICE   11/20/2021         CHIEF COMPLAINT   \"not sure exactly what happened\"       HISTORY OF PRESENT ILLNESS   This is a 31 year old male with history notable for bipolar disorder who presented to the ED via EMS due to concerns related to altered mental status. Current legal status is 72 hour hold. 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 Care coordinations performed in details includes obtaining collateral information from [facility] everywhere records. In brief, Patient reportedly took some pills while hanging out with a 'new friend' at a party and became manic, agitated and confused. Police called as he continued to be increasingly confused and agitated. Patient reportedly became more aggressive when the EMS arrived which prompted administration of ketamine as he was restrained and brought to the ER.     Patient was seen and evaluated in the consult room by himself. Patient presented as somewhat confused with occasional distractibility and poverty of speech. Patient stated he does not remember how he got to the hospital, requesting to be discharged, saying he does not need to be kept in the hospital for further observation. Patient stated he went to visit a new friend at home. He reported he and this new friend smoked weed together and realized he was unable to leave his friend's place thereafter as he kept coming back to his friend's garage whenever he made the move to leave. When writer asked if patient ever went to a party with his new friend of which he said no, saying he was just at the friend's place smoking marijuana. Writer inquired if patient remembered brandishing knife when the the EMS arrived, of which he said probably. Patient stated he carries knife around just " for self-defence. Patient vehemently denied ever being at any party or taking pills at the party. Patient currently denies suicidal and homicidal thoughts. Patient also denies auditory and visual hallucinations. Apart from occasional forgetfulness and struggling to gain insight, no overt psychosis noted during the assessment.    Per chart review, he sees psychiatrist and therapist via [facility] for the management of his mental health conditions. Care everywhere does not indicate any recent  Psychiatric hospitalizations. Records show patient has been on Abilify 10 mg trial for Bipolar which he reports he does not take regularly. Patient also reported taking Adderall to help with concentration at work. Per chart review, he does not have prescription for adderall as he only buys it from people. Patient denies CD dependency treatment. Though his UDS positive for amphetamine, this is because he takes adderall to help with concentration. Patient was started with a low dose of Abilify 5 mg to help with irritability and any residual psychosis.       ,..................................... 's Note...................................................        [facility] Crisis Assessment:    Started at:  1:52pm  Completed at: 2:37pm  Patient was assessed via virtually ([employer] cart or other teleconferencing device).     Chief Complaint and History of Presenting Problem:     Patient is a 31 year old  male who presented to the ED by Med[facility] related to concerns for mental health issues. The patient was seen today by the [facility] ([facility]), through virtual crisis assessment.       The patient is calm, cooperative, alert, and oriented x3. He seems suspicious, mistrustful, and slightly confused. He demonstrates slight labile mood as he goes from irritability to tearing up. He admits feeling confusion and not understanding how he arrived in the ED. He indicates he had blacked out last night. Endorses  suicidal ideation but no plan. Patient has recently not felt the need for sleep. He is uncertain if he is taking his medications as prescribed. He denies having auditory or visual hallucinations. Denies homicidal ideation. Denies use of alcohol or illicit drugs.     Patient was at a party last night and was seen taking pills. He was observed to be acting strangely which prompted them to call police. Patient was agitated and aggressive and needed to be placed in restraints. EMS needed to give him ketamine IM. He is prescribed adderal which he admits he took last night. Patient indicates he might have a history of bipolar disorder and is from [place] and  of the [employer]. He left the [employer] about 6 months ago. Medical records indicate he was released from the [facility] in 2018. Patient is not considered to be an accurate historian.     Assessment and intervention involved meeting with pt, obtaining collateral information from [facility] and Care Everywhere records, employing crisis psychotherapy including: Establishing rapport, Active listening and Assess dimensions of crisis. Collateral information includes consulted with the ED provider and nursing. Patient was uncertain about having DEC contact his family and indicated he didn't want to alarm them.     Biopsychosocial Background and Demographic Information     Patient is a  of the [employer] and has been out for about 6 months. He reports to have supportive parents and a sister. He was unable to provide other relevant history.     Mental Health History and Current Symptoms      Mental Health History (prior psychiatric hospitalizations, civil commitments, programmatic care, etc): He endorses previous history of bipolar disorder and at least 1 psychiatric hospitalization which occurred about 3 years ago. He is unable to provide other relevant history regarding his mental health treatments. He was confused about which medications he is taking but mentions Adderall, Depakote,  Lithium, and Clonazapm. [facility] and he is having difficulty understanding or managing his medications.          CHEMICAL DEPENDENCY HISTORY   History   Drug Use No       Social History    Substance and Sexual Activity      Alcohol use: No      History   Smoking Status     Current Some Day Smoker     Types: Cigarettes   Smokeless Tobacco     Current User     Types: Chew       Treatment: Patient denies   Detox: Patient denies   Legal: Patient denies        PAST PSYCHIATRIC HISTORY   Psychiatrist: Yes, with [facility]  Therapist: Yes with [facility]  Case Management: No  Hospitalizations: No    History of Commitment: No  Past Medications: Abilify  ECT:  No  Suicide Attempts/Gun Access: Patient denies suicide attempts/ Denies gun access  Community Supports: [facility]       PAST MEDICAL HISTORY   Past Medical History:   Diagnosis Date     Acne      Migraine        Past Surgical History:   Procedure Laterality Date     LAPAROSCOPIC APPENDECTOMY  2002     PHOTOREFRACTIVE KERATECTOMY Bilateral 06/2012     [place] teeth extraction         Primary Care Provider: Arvin Melendrez  Medications:     ARIPiprazole  5 mg Oral Daily     Medications as needed: acetaminophen, alum & mag hydroxide-simethicone, guaiFENesin, hydrOXYzine, OLANZapine **OR** OLANZapine, senna-docusate, traZODone    ALLERGIES: Amoxicillin       MEDICATIONS   No current outpatient medications on file.       Medication adherence issues: MS Med Adherence Y/N: Yes, Forgetting to take  Medication side effects: MEDICATION SIDE EFFECTS: no side effects reported  Benefit: Yes / No: Yes       ROS   Constitutional: Negative for fever.   Respiratory: Negative for shortness of breath.    Cardiovascular: Negative for chest pain.   Gastrointestinal: Negative for abdominal pain.   Psychiatric/Behavioral: Positive for agitation, irritability, and confusion.   All other systems reviewed and are negative.       FAMILY HISTORY  "  Family History   Problem Relation Age of Onset     C.A.D. Maternal Grandfather      Cancer Paternal Grandmother         Skin CA        Psychiatric: Patient denies   Chemical: Patient denies   Suicide: Patient denies        SOCIAL HISTORY   Social History     Socioeconomic History     Marital status: Single     Spouse name: Not on file     Number of children: Not on file     Years of education: Not on file     Highest education level: Not on file   Occupational History     Not on file   Tobacco Use     Smoking status: Current Some Day Smoker     Types: Cigarettes     Smokeless tobacco: Current User     Types: Chew   Substance and Sexual Activity     Alcohol use: No     Drug use: No     Sexual activity: Not Currently     Partners: Female   Other Topics Concern     Parent/sibling w/ CABG, MI or angioplasty before 65F 55M? Not Asked   Social History Narrative     Not on file     Social Determinants of Health     Financial Resource Strain: Not on file   Food Insecurity: Not on file   Transportation Needs: Not on file   Physical Activity: Not on file   Stress: Not on file   Social Connections: Not on file   Intimate Partner Violence: Not on file   Housing Stability: Not on file       Born and Raised: [place]  Occupation:   Marital Status: Single  Children: None  Legal:  72 hour hold  Living Situation: Living arrangements - the patient lives with their family and lives alone  ASSETS/STRENGTHS:  Being a veteral       MENTAL STATUS EXAM   Appearance:  Casually groomed  Mood:  \"clear\"  Affect: full range  was congruent to speech, mood congruent, intensity is normal and reactive  Suicidal Ideation: PRESENT / ABSENT: absent   Homicidal Ideation: PRESENT / ABSENT: absent   Thought process: response delay and thought blocking   Thought content: denies suicidal ideation, violent ideation, delusions, magical thinking and paranoid ideation.   Fund of Knowledge: Average  Attention/Concentration: Easily " distracted  Language ability:  Impaired  Memory:  Immediate recall intact, Short-term memory impaired and Long-term memory impaired  Insight:  struggles to gain insight.  Judgement: fair  Orientation: Person:  yes  Place:  yes  Time:  yes  Situation:  no  Psychomotor Behavior: denies tardive dyskinesia, dystonia or tics    Muscle Strength and Tone: MuscleStrength: Normal  Gait and Station: Normal       PHYSICAL EXAM   Vitals: BP (!) 150/70 (BP Location: Right arm, Patient Position: Sitting)   Pulse 74   Temp 98  F (36.7  C) (Oral)   Resp 18   Wt 94.9 kg (209 lb 3.2 oz)   SpO2 96%   BMI 28.37 kg/m      Physical exam as per Ezekiel Anglin CNP. Dated 11/20/21  Physical Exam  Vitals and nursing note reviewed.   Constitutional:       General: he is not in acute distress.     Appearance: Normal appearance. he is not diaphoretic.   HENT:      Head: Atraumatic.      Mouth/Throat:      Pharynx: No oropharyngeal exudate.   Eyes:      General: No scleral icterus.     Pupils: Pupils are equal, round, and reactive to light.   Cardiovascular:      Rate and Rhythm: Normal rate and regular rhythm.      Heart sounds: Normal heart sounds.   Pulmonary:      Effort: No respiratory distress.      Breath sounds: Normal breath sounds.   Abdominal:      General: Bowel sounds are normal.      Palpations: Abdomen is soft.      Tenderness: There is no abdominal tenderness.   Musculoskeletal:         General: No tenderness.   Skin:     General: Skin is warm.      Findings: No rash.   Neurological:      General: No focal deficit present.      Mental Status: he is alert and oriented to person, place, time but disoriented to situation         LABS   personally reviewed.   Lab Results   Component Value Date     11/18/2021    CO2 29 11/18/2021    BUN 17 11/18/2021     No results found for: PHENYTOIN, PHENOBARB, VALPROATE, CBMZ       ASSESSMENT   This is a 31 year old male with history notable for bipolar disorder who was admitted to the  inpatient psychiatric unit due to impaired mental status and agitation. Patient is on a 72 hour hold. Patient is still somewhat confused but no overt psychosis noted during the assessment. He denied suicidal and homicidal thoughts. Patient started on Abilify 5 mg daily.        DIAGNOSIS   Principal Problem:    Bipolar I disorder, current episode mixed,     Active Problem List:  Patient Active Problem List   Diagnosis     Other acne     CARDIOVASCULAR SCREENING; LDL GOAL LESS THAN 160     Psychosis (H)          PLAN   1. Education given regarding diagnostic and treatment options with risks, benefits and alternatives and adequate verbalization of understanding.  2. Admitted on a 72 hour hold originally to [address] [place] [address] and transferred to [address] [place] [address] due to history of aggression        Precautions placed .  3. Medications: 11/20/2021: PTA medications reviewed.    Abilify  4. Medications:  Hospital    Abilify 5 mg daily  5. Consultations:    Hospitalist to follow as needed.  6. Structure and Supervision    Unit [address] [place] [address]    Barriers to Discharge: Exacerbation of symptoms  7.   is following in regards to collecting and reviewing collateral information, referrals and disposition planning.    Legal:  72 hour hold    Referrals:      Care Coordination: [facility]    Anticipated Discharge:  3-5 day  Further treatment programming to be determined throughout the hospital course.        Risk Assessment: [facility] RISK ASSESSMENT: Patient able to contract for safety and Patient on precautions    This note was created with help of Dragon dictation system. Grammatical / typing errors are not intentional.    ANDREW Johansen CNP       CERTIFICATION   Initial Certification I certify that the inpatient psychiatric facility admission was medically necessary for treatment which could   reasonably be expected to improve the patient s condition.                                        I estimate 3-5 days of hospitalization is necessary for proper treatment of the patient. My plans for post-hospital care for this patient are home with self-care.                                       ANDREW Johansen CNP     -     11/20/2021     -     3:36 PM

## 2021-11-20 NOTE — PROGRESS NOTES
Pt initially agreed to join group and walked to the group space.  Pt immediately left group without explanation and did not return.       11/20/21 1207   Engagement   Intervention Group   Topic Detail Processing bingo to encourage sharing of thoughts/feeling, coping, symptom management, scanning, delayed gratification, social skill expansion, building insight and an opportunity to experience success   Attendance Did not attend   Reason for Not Attending Refused

## 2021-11-20 NOTE — PLAN OF CARE
"    Initial Psychosocial Assessment    Type of CM visit: Initial Assessment, Clinical Treatment Coordinator Role Introduction, Offer Discharge Planning    Information obtained from: [x]Patient   [x]Chart review  []Collateral Contacts  [x]Court Website    Hospitalization information:     Robert J Kreyer is a 31 year old who was admitted to unit [facility] on 11/19/2021 due to psychosis. Patient reported he met a person through his job on Wednesday. This person reported he was also a  and invited the patient to dinner that evening and then they went to this person's house. The patient reported he believed this person could relate to being a  and that the person was offering the patient friendship/help. At some point during the evening the patient reported he began to feel uncomfortable as this person appeared to be lying about a number of things. Also at some point during the evening the patient reported the person invited the patient to smoke marijuana. The patient reported he doesn't usually smoke marijuana as it makes him paranoid but did so due to feeling socially obligated. Patient reported he attempted to leave the house but somehow kept ending up back in the house and isn't sure how this happened. The patient reported the police came and he isn't sure if he or the other person called the police. The patient reported it as difficult to remember much of what happened next prior to understanding he is currently hospitalized. The patient reported he doesn't use drugs and drinks approximately 2-3 drinks socially per weekend. He denies having an addiction issue and reported he has never been to [facility] treatment.  His UDS was negative except for amphetamine. Patient reported he believes his medications are working properly and denied suicidal or homicidal ideation. He attributed the events leading to his hospitalization to poor judgement and that he is doing \"fine\" and would like to be released as soon as " "possible. Patient was calm, cooperative and polite during our conversation.     Patient Self-Assessment  Patient reported reason for admission: \"Things spiraled out of control\".     Patient reported symptoms of concern: [x]sadness    [x]anxiety     [x]anger    []poor sleep     []medications not working    []racing thoughts     []substance use     [x]agitation     []hearing voices     []hopelessness   []Eating concerns    []Self-injury      [] Other   Comments:    Current suicidal ideation:  [x]No    []Yes, no plan     []Yes, with plan (describe):          Comments:   Current homicidal ideation:  [x]No   [] Yes       Comments:     Legal Status at Admission: 72 Hour Hold  72 Hour Hold - Date/Time Initiated: 11/18/2021 at 1128  72 Hour Hold - Date/Time Ends: 11/23/2021 at 1128      History of Mental Health:  Describe current and past mental health symptoms present?     Patient reported he is diagnosed with bipolar and with some hesitation reported he believes this is accurate. Patient reported his medications are working well.  He reported he is employed full-time and \"loves\" what he does. He reported feeling lonely due to not being in a romantic relationship.         Do you understand your mental health diagnosis? YES [x]   NO []    History of psychiatric hospitalizations?  YES [x]     NO  []  Details:  Four years ago, reluctant to elaborate   If YES, within the last 30 days? YES []     NO  [x]    History of commitment?  YES []     NO  [x]    Details:   History of ECT?  YES []     NO  [x]    Details:     History of Substance Use Disorder:  Have you used alcohol or substances in the past 12 months? YES [x]/ NO []              If Yes, Type alcohol Frequency   2-3 drinks socially per weekend.    Type marijuana  Frequency once (UDS negative)  Would you like a substance use disorder evaluation? YES [] / NO [x]    Previous Treatment? YES [x]/ NO []  Details:     Significant Life Events  (Illness, Death, Loss):  Patient " reported his discharge 3 years ago from the  still bothers him.       Is there a history of abuse or psychological trauma:    []Denies       []Yes, present (type):         [x]Yes, past (type): patient was hesitant to elaborate     []Patient declined to answer    Identify current stressors:    [x]financial,    []legal issues,    []homelessness,    []housing,     []recent loss,    [x]relationships,    []substance use concerns,    []medical     []unemployment     []employment  concerns    []isolation,    []lack of resources,     []out of home placements,     []parenting issues     []domestic violence     []other:  Comments: reported feeling lonely due to not being in a romantic relationship      Living Situation:     [x]House/apt    []Group Home    []IRTS     []Homeless     []Assisted Living     []Nursing home    []Lives alone    []Lives with : alone                        []Other:          Family Composition: Mom, dad, sister with whom he has little contact. Identifies his coworkers as his current family.     Children, ages and current location if minor: [place]     Relationship status  [x]Single     []     []     []       []Significant Other   []Other:     Educational Background:  []Less Than High School     []High School     []GED     [x]College  BA       Cognitive/learning concerns: none reproted    Financial Status: [x] Employed, status and location:  []Unemployment    []County Assistance     []SSI/SSDI      []Waivered services    []Other:    Legal status(present):   []Voluntary, [x]72-hour hold, []Commitment, []Guardianship, []Revocation, []Stay of commitment,    Details:    Other legal issues identified:  [x]None, []Arrest,  []Probation/[place],  []Driving under influence,  []Incarceration,  []Sexual offense (level):   []Child Protective Services,      []Other:      Details:    Ethnic/Cultural considerations:  None reported    Spiritual considerations: none reported      Service History:  []No     [x]Yes: details:    Social Functioning (organization, interests) and strengths: writer did not ask    Current Treatment Providers Are:   unknown     NO Name, Agency, and phone   Psychiatrist  []    Psychotherapist  []    [facility] worker  []      []    Waivered Services  []    [facility]  []    Day Treatment/PHP/[facility] trtmt  []    Group Home/AFC/AL  []      []    Other:  []            Social Service Assessment of identified patient needs and plan to meet those needs:     Patient might benefit from a period of stabilization to better determine the cause of the psychosis that led to his hospitalization. Patient might benefit from coordination of services with his community providers. Writer can assist in coordinations of services and provide support as needed.       Possible discharge plan: TBD        Barriers: Medication Management, Symptom Stabilization, Coordination of Care

## 2021-11-20 NOTE — ED NOTES
Report given to [facility]. Updated patient on transfer. Pt requested anxiety medication. Zyprexa given.

## 2021-11-20 NOTE — PLAN OF CARE
"  Problem: Behavioral Health Plan of Care  Goal: Adheres to Safety Considerations for Self and Others  Outcome: Improving  Intervention: Develop and Maintain Individualized Safety Plan  Recent Flowsheet Documentation  Taken 11/20/2021 1049 by Ally Holbrook RN  Safety Measures: safety rounds completed     Problem: Suicidal Behavior  Goal: Suicidal Behavior is Absent or Managed  Outcome: Improving     Problem: Behavioral Health Plan of Care  Goal: Develops/Participates in Therapeutic [place] to Support Successful Transition  Outcome: Improving  Intervention: Foster Therapeutic [place]  Recent Flowsheet Documentation  Taken 11/20/2021 1049 by Ally Holbrook RN  Trust Relationship/Rapport:    care explained    thoughts/feelings acknowledged    Patient calm, cooperative, rated anxiety 0/10, depression 0/10, denies all others  psych symptoms. Contracted for safety. Patient wants to be discharged so he can return to work on Monday. Patient informed that, staff will notify his doctor about his requested/concerns. Gave pt admission package after signing,  Notification Of  Patient Right Form.  (REQUEST FOR AND AUTHORIZATION TO RELEASE HEALTH INFORMATION), given to pt for him to fill. Patient started, \"filling the form, later returned  document to writer. According to pt the document is complex to complete, he needs more explaining from his doctor on filling the form\".     "

## 2021-11-20 NOTE — PHARMACY-ADMISSION MEDICATION HISTORY
Medication history completed at [facility]. Please see note from 11/18/21 for details. Thanks.     Rah Faith RP on 11/20/2021 at 8:19 AM

## 2021-11-20 NOTE — PLAN OF CARE
Problem: Behavioral Health Plan of Care  Goal: Adheres to Safety Considerations for Self and Others  Outcome: Improving     Problem: Behavioral Health Plan of Care  Goal: Absence of New-Onset Illness or Injury  Outcome: Improving     Pt denies SI/HI, appeared calm and make requests appropriately.  Remains on assault, cheeking, elopement, and suicide precautions.     0145- Pt c/o difficulty falling back to sleep and staying asleep and sore throat with cough. Trazodone and Robitussin prns given per pt request. Pt went back to bed after he received meds.  Meds effective. Pt slept for about 6 hours.

## 2021-11-20 NOTE — ED NOTES
EMS here to transport pt to [facility]. Pt anxious and pacing before getting onto stretcher. Pt's belongings checked over with pt prior to leaving.

## 2021-11-20 NOTE — PLAN OF CARE
Problem: Behavioral Health Plan of Care  Goal: Adheres to Safety Considerations for Self and Others  Outcome: No Change  Intervention: Develop and Maintain Individualized Safety Plan  Recent Flowsheet Documentation  Taken 11/19/2021 2153 by Lila Alves RN  Safety Measures:   safety rounds completed   suicide check-in completed   suicide assessment completed  Goal: Absence of New-Onset Illness or Injury  Outcome: No Change  Intervention: Identify and Manage Fall Risk  Recent Flowsheet Documentation  Taken 11/19/2021 2153 by Lila Alves RN  Safety Measures:   safety rounds completed   suicide check-in completed   suicide assessment completed  Goal: Optimized Coping Skills in Response to Life Stressors  Outcome: No Change     Behavior is calm and cooperative. Affect is tense. Pt is able to verbalize needs.  Lila Alves RN

## 2021-11-21 PROCEDURE — 250N000013 HC RX MED GY IP 250 OP 250 PS 637: Performed by: NURSE PRACTITIONER

## 2021-11-21 PROCEDURE — 128N000001 HC R&B CD/MH ADULT

## 2021-11-21 PROCEDURE — 250N000013 HC RX MED GY IP 250 OP 250 PS 637: Performed by: PSYCHIATRY & NEUROLOGY

## 2021-11-21 RX ORDER — NICOTINE 21 MG/24HR
1 PATCH, TRANSDERMAL 24 HOURS TRANSDERMAL DAILY
Status: DISCONTINUED | OUTPATIENT
Start: 2021-11-21 | End: 2021-11-23 | Stop reason: HOSPADM

## 2021-11-21 RX ORDER — ARIPIPRAZOLE 10 MG/1
10 TABLET ORAL DAILY
Status: DISCONTINUED | OUTPATIENT
Start: 2021-11-22 | End: 2021-11-23 | Stop reason: HOSPADM

## 2021-11-21 RX ADMIN — ARIPIPRAZOLE 5 MG: 5 TABLET ORAL at 08:13

## 2021-11-21 RX ADMIN — Medication 1 PATCH: at 15:52

## 2021-11-21 RX ADMIN — GUAIFENESIN 10 ML: 200 SOLUTION ORAL at 16:20

## 2021-11-21 RX ADMIN — TRAZODONE HYDROCHLORIDE 50 MG: 50 TABLET ORAL at 22:22

## 2021-11-21 ASSESSMENT — ACTIVITIES OF DAILY LIVING (ADL)
ORAL_HYGIENE: INDEPENDENT
HYGIENE/GROOMING: INDEPENDENT
HYGIENE/GROOMING: INDEPENDENT
DRESS: STREET CLOTHES;INDEPENDENT
ORAL_HYGIENE: INDEPENDENT

## 2021-11-21 NOTE — PLAN OF CARE
"  Problem: Behavioral Health Plan of Care  Goal: Plan of Care Review  Outcome: No Change  Flowsheets (Taken 11/20/2021 2035)  Plan of Care Reviewed With: patient  Patient Agreement with Plan of Care: (Frustrated with process) agrees with comment (describe)     Problem: Suicidal Behavior  Goal: Suicidal Behavior is Absent or Managed  Outcome: No Change     Problem: Behavioral Health Plan of Care  Goal: Optimized Coping Skills in Response to Life Stressors  Outcome: Improving     Problem: Behavioral Health Plan of Care  Goal: Adheres to Safety Considerations for Self and Others  Intervention: Develop and Maintain Individualized Safety Plan  Recent Flowsheet Documentation  Taken 11/20/2021 2035 by Lila Alves RN  Safety Measures:   safety rounds completed   suicide assessment completed   suicide check-in completed  Goal: Absence of New-Onset Illness or Injury  Intervention: Identify and Manage Fall Risk  Recent Flowsheet Documentation  Taken 11/20/2021 2035 by Lila Alves RN  Safety Measures:   safety rounds completed   suicide assessment completed   suicide check-in completed  Goal: Develops/Participates in Therapeutic [place] to Support Successful Transition  Intervention: Foster Therapeutic [place]  Recent Flowsheet Documentation  Taken 11/20/2021 2035 by Lila Alves RN  Trust Relationship/Rapport:   care explained   empathic listening provided   questions encouraged   questions answered   thoughts/feelings acknowledged     Pt is isolative to room for majority of shift. Pt does not attend community meeting. Pt is calm, cooperative, and engaged when in [place]. Behavior is tense, blunted, and guarded with impaired insight. Affect is blunted. Eye contact is appropriate. Perception is mood-incongruent (pt is irritable and dismissive during assessment and then states, \"thanks a lot\" when writer is leaving room. Pt denies all psych symptoms. Pt takes a shower at 1806. Dad visits at 1900 and " "behavior is appropriate. Appetite is adequate; pt eats 100% of dinner and refuses snack. Pt signs SHALOM for [facility] when he receives regular treatment and therapy. SHALOM is tubed to pharmacy and copy put in chart. Pt requests Robitussin at 2038 for sore throat. Pt is cooperative with admission process, stating, \"Let's just get it over with\". Pt continues with assault, cheeking, elopement, and suicide precautions. Pt voices no further concerns. Staff will continue to monitor.  Lila Alves RN     "

## 2021-11-21 NOTE — PLAN OF CARE
"  Problem: Behavioral Health Plan of Care  Goal: Plan of Care Review  Outcome: Improving  Flowsheets (Taken 11/21/2021 1632)  Plan of Care Reviewed With: patient  Patient Agreement with Plan of Care: agrees  Goal: Adheres to Safety Considerations for Self and Others  Outcome: Improving  Goal: Absence of New-Onset Illness or Injury  Outcome: Improving  Goal: Optimized Coping Skills in Response to Life Stressors  Outcome: Improving  Goal: Develops/Participates in Therapeutic [place] to Support Successful Transition  Outcome: Improving  Intervention: Foster Therapeutic [place]  Recent Flowsheet Documentation  Taken 11/21/2021 1632 by Lila Alves RN  Trust Relationship/Rapport:    care explained    empathic listening provided    questions answered    questions encouraged    reassurance provided    thoughts/feelings acknowledged     Problem: Suicidal Behavior  Goal: Suicidal Behavior is Absent or Managed  Outcome: Improving     Pt is social and engaged on unit. Pt requests numbers from his phone at start of shift. Behavior is calm and cooperative. Eye contact is appropriate. Pt with good insight to situation and is oriented x4, with full-range affect. During assessment, pt reflects on events that brought him here and states, \"I probably shouldn't have gone there\". Pt admits that he is glad he ended up here and has learned some important things about himself. Pt is hopeful for the future. Pt has a history of bipolar disorder and states, \"I have never liked taking medications\". Pt states he manages his bipolar with sleep, exercise, nutrition, and stress management. Pt admits that he had not been getting adequate sleep. Pt is educated on self care and the importance of medications if they are needed. Pt is also encouraged to have a backup plan when circumstances happen in life. Pt is hopeful for discharge soon and states he receives services at the [facility]. Pt plays games with staff and peers.     Pt states " "that he feels \"cooped up\" here on 55C and asks to walk the halls on [address] with staff supervision. Behavior is appropriate and will allow when staff is available. Appetite is adequate and pt denies any pain. Pt is cooperative with medications and requests PRN Robitussin for sore throat.     Pt's dad visits at 1900. Visit is appropriate and support is appreciated by patient. PRN Trazodone requested and given at 2222.    Pt continues with assault, cheeking, elopement, and suicide precautions. Pt voices no further concerns. Staff will continue to monitor.  Lila Alves RN    "

## 2021-11-21 NOTE — PROGRESS NOTES
11/21/21 1223   Engagement   Intervention Group   Topic Detail OT Creative Expressions Group-Window cling coloring for creativity, healthy distraction, social engagement and symptom management   Attendance Did not attend   Reason for Not Attending   (pt in lounge intermittently during group time)   Patient Response Was respectful;Asked questions and/or took notes;Accepted feedback

## 2021-11-21 NOTE — PLAN OF CARE
Occupational Therapy   AIDET      Patient was introduced to the role of occupational therapy and oriented to the process of occupational therapy services on the unit, including function of groups. Patient was given the Occupational Therapy Questionnaire to complete. Patient in lounge on approach. Pt agreeable to complete form. OT will follow up with assessment and address any questions, needs, or concerns.    Pt was provided a journal and a yoga mat for exercising in his room    11/21/2021

## 2021-11-21 NOTE — PLAN OF CARE
Problem: Behavioral Health Plan of Care  Goal: Adheres to Safety Considerations for Self and Others  Outcome: Improving     Problem: Behavioral Health Plan of Care  Goal: Absence of New-Onset Illness or Injury  Outcome: Improving     Pt is on assault, cheeking, and elopement precautions. None of these behavior noted. He slept well through the night. Nursing will monitor.

## 2021-11-21 NOTE — PLAN OF CARE
Problem: Behavioral Health Plan of Care  Goal: Adheres to Safety Considerations for Self and Others  Outcome: Improving     Problem: Suicidal Behavior  Goal: Suicidal Behavior is Absent or Managed  Outcome: Improving   Patient observed quiet, tense, and irritable during assessment process. Expressed frustrated, wants to leave, very bold in here (hospital). Denies anxiety, depression, suicide ideation and denies auditory or visual hallucinations. Will continue to monitor pt behaviors and encourage him to engage and attend unit community group meetings/activities. Requested to use headphone, left sticky not for MD. Headphone given to pt per ordered. Patient requested nicotine patch and gum, no order found. Text paged on call provider about pt requests. Telephone orders placed per on call requested.

## 2021-11-22 LAB — SARS-COV-2 RNA RESP QL NAA+PROBE: NEGATIVE

## 2021-11-22 PROCEDURE — 128N000001 HC R&B CD/MH ADULT

## 2021-11-22 PROCEDURE — 250N000013 HC RX MED GY IP 250 OP 250 PS 637: Performed by: NURSE PRACTITIONER

## 2021-11-22 PROCEDURE — 99232 SBSQ HOSP IP/OBS MODERATE 35: CPT | Performed by: NURSE PRACTITIONER

## 2021-11-22 PROCEDURE — 250N000013 HC RX MED GY IP 250 OP 250 PS 637: Performed by: PSYCHIATRY & NEUROLOGY

## 2021-11-22 PROCEDURE — 87635 SARS-COV-2 COVID-19 AMP PRB: CPT | Performed by: NURSE PRACTITIONER

## 2021-11-22 RX ORDER — CARBOXYMETHYLCELLULOSE SODIUM 5 MG/ML
1 SOLUTION/ DROPS OPHTHALMIC 4 TIMES DAILY PRN
Status: DISCONTINUED | OUTPATIENT
Start: 2021-11-22 | End: 2021-11-23 | Stop reason: HOSPADM

## 2021-11-22 RX ADMIN — GUAIFENESIN 10 ML: 200 SOLUTION ORAL at 02:01

## 2021-11-22 RX ADMIN — Medication 1 PATCH: at 08:41

## 2021-11-22 RX ADMIN — CARBOXYMETHYLCELLULOSE SODIUM 1 DROP: 0.5 SOLUTION/ DROPS OPHTHALMIC at 13:03

## 2021-11-22 RX ADMIN — TRAZODONE HYDROCHLORIDE 50 MG: 50 TABLET ORAL at 02:01

## 2021-11-22 RX ADMIN — BENZOCAINE AND MENTHOL 1 LOZENGE: 15; 3.6 LOZENGE ORAL at 13:02

## 2021-11-22 RX ADMIN — ARIPIPRAZOLE 10 MG: 10 TABLET ORAL at 08:41

## 2021-11-22 NOTE — PLAN OF CARE
Problem: Behavioral Health Plan of Care  Goal: Adheres to Safety Considerations for Self and Others  Outcome: Improving     Problem: Behavioral Health Plan of Care  Goal: Absence of New-Onset Illness or Injury  Outcome: Improving     Continues to be on assault, cheeking, elopement, and suicide precautions. None of these behaviors noted this night and pt denied SI/HI, contracts for safety. Pt woke up at 0145 for snack and c/o difficulty falling back to sleep and sore throat.  Pt is calm, cooperative, and make requests appropriately from staff.  Pt received repeated dose of Trazodone prn and Robitussin at 0201 per his request.  Meds effective, pt appeared to be sleeping during re-assessment and has slept for 6 hrs through the night.  Nursing will monitor.

## 2021-11-22 NOTE — PROGRESS NOTES
"Pt is pleasant, polite, intent on being discharged today. \"Someone else need his bed, I shouldn't be here taking a bed from someone.\" Pt acknowledges that behavior that brought him here was concerning. \"I agree, that will never happen again. I'm not even sure what happened.\"    Pt was intermittently restless throughout the shift unable able to speak with provider. Pt participated in groups. Pt resting on his bed at end of day shift.    Pt denied psych symptoms, denies SI. Report given to evening shift.    Rosanna Ortiz RN    "

## 2021-11-22 NOTE — PROGRESS NOTES
11/22/21 1102   Engagement   Intervention Group   Topic Detail OT Creative Expressions group-tile boxes for social engagement, focus, following directions, creativity, symptom management and healthy distraction   Attendance Attended   Patient Response Demonstrated understanding of materials provided;Was respectful   Concentrated on Task 30 - 45 min   Cognition Goal-directed;Attends to detail;Sequences task   Mood/Affect Anxious;Content   Social/Behavioral Cooperative;Engaged   Goals addressed in session today pt was able to complete sanding, staining and setting of tile design prior to end of group. pt was independent with all aspects of project. pt was polite and appropriate during interactions with staff and peers

## 2021-11-22 NOTE — PLAN OF CARE
Assessment/Intervention/Current Symtoms and Care Coordination    Patient reported he is feeling stable and denied experiencing auditory or visual halluncinations,  HI or SI. We talked about the results of his tox screen which was negative for THC and reactive for amphetamine. Patient reported it is still his recollection that he smoked marijuana prior to experiencing psychosis and did not use other substances. Patient reported he would like to discharge as soon as possible. His 72-hr hold expires tomorrow at 11:28am.       Discharge Plan or Goal    TBD    Barriers to Discharge     medication management     Referral Status    none    Legal Status    72-hour hold      SHOSHANA Pittman, [facility], 11/22/2021, 2:27 PM

## 2021-11-22 NOTE — PLAN OF CARE
11/22/21 [facility]   Occupational Therapy Psychosocial Assessment   Assessment Type Interview;Interview Form;Chart Review   ADLs   Activity/Exercise/Self-Care Comment pt works full time   Instrumental Activities of Daily Living (IADL)   IADL Comments pt independent with IADLs/ADLs   Functional Mobility   Functional Mobility independent   Equipment Currently Used at Home none   Therapy Assessment   Patient Presentation This is a 31 year old male with history notable for bipolar disorder who presented to the ED via EMS due to concerns related to altered mental status. Current legal status is 72 hour hold. 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    Patient-identified areas of success Time spent with family or friends;Time spent relaxing and enjoying;Having a satisfying routine;Work or volunteering;Concentrating on own tasks;Living independently;Taking care of the place of living;Transportation;Managing own finances;Managing basic needs (food, medicine, sleep);Learning new things;Ability to pursue personal goals;Healthy leisure activities   Specifics of work or volunteer work [employer]   Healthy Leisure Activities working out. shooting. hunting. fishing. singing   Patient-identified areas of difficulty Difficulty concentrating;Motivation/mood;Finances   Patient-identified sources of support Safe place to live;Family, friends or caregivers to help when needed;Belief in self and abilities;Access to email, social media, phone calls;Leisure supplies to support own interests and hobbies;Routines and rituals that support own wellness   Patient-identified emotional, physical or mental health concerns that my doctors won't see that I actually need certain medication I may not actually be prescribed   Patient-identified coping  stategies for these concerns things seem to [facility] the [facility] the [facility]   Patient-identified stressors or changes in the past year jobs, friend group   Patient-identified values being around people I can't trust and not have to constantly verify   Patient's goal for the future to be successful in what I put my mind to and have a family of my own   Patient's goals to work on with OT Learn ways to stay well and avoid coming to the hospital;Improve concentration;Relax and enjoy oneself   Clinical Impression   Patient Personal Strengths expressive of needs;expressive of emotions;independent living skills   Pt appears to have the following barriers/limitations Ruminating thoughts   Patient's barriers/limitations contribute to Exacerbation of mental health symptoms   Planned Interventions Encourage group attendance;Identify and develop coping strategies;Identify and develop relapse prevention plan;Continue to build rapport;Engage in activities for insight development;Encourage engagement in meaningful activities;Encourage improved social interaction skills;Improve self-management skills;Improve work and leisure process skills   Risk & Benefits of therapy have been explained care plan/treatment goals reviewed;patient   Minutes Spent with Patient 5   Beh OT Plan for Next Session Patient will identify 6 healthy coping skills to manage stress and reduce symptoms this reporting period

## 2021-11-22 NOTE — PROGRESS NOTES
"PSYCHIATRY  PROGRESS NOTE     DATE OF SERVICE   11/22/2021         CHIEF COMPLAINT   \"I would like to discharge\"       SUBJECTIVE   Nursing reports: Patient was calm and cooperative for the most part but became somewhat irritable and agitated as he would like to discharge today. Cough drops and Refresh ordered for sore throat and dry eyes. He denied all psych symtoms     reports: Per SW \" Patient reported he is feeling stable and denied experiencing auditory or visual halluncinations,  HI or SI. We talked about the results of his tox screen which was negative for THC and reactive for amphetamine. Patient reported it is still his recollection that he smoked marijuana prior to experiencing psychosis and did not use other substances. Patient reported he would like to discharge as soon as possible. His 72-hr hold expires tomorrow at 11:28am. \"       OBJECTIVE   Chart reviewed and patient assessed. Patient was seen and evaluated in the [facility] area by himself. Upon patient interview, patient reports he is ready for discharge as he believes symptoms have improved since he arrive here. Patient stated he thinks the person who gave him the marijuana that he smoked before taking to the ER must have laced it with some powerful substances that he had never used before since the THC did not show up in his system. Patient continued to repeat the story of getting Adderall from the street, saying the [facility] is [facility] he is taking Adderall to help hm concentrate at work, Patient presented as calm, polite and cooperative during the meeting. Patient denied suicidal and homicidal thought. No overt psychosis noted during this assessment. Of note, staff reported patient has been complaining of sore throat for about two days. Patient stated the sore throat is probably due to throat dryness related to decreased fluid intake. Patient started on cough drops PRN for his sore throat in addition to Covid 19 test to rule out covid for " "symptomatic patient per the hospital policy. Patient will discharge home tomorrow after the expiration of his 72 hour hold         MEDICATIONS   Medications:  Scheduled Meds:    ARIPiprazole  10 mg Oral Daily     nicotine  1 patch Transdermal Daily     nicotine   Transdermal Q8H     Continuous Infusions:  PRN Meds:.acetaminophen, alum & mag hydroxide-simethicone, sore throat lozenge, artificial tears ophthalmic solution, guaiFENesin, hydrOXYzine, nicotine, OLANZapine **OR** OLANZapine, senna-docusate, traZODone    Medication adherence issues: MS Med Adherence Y/N: No  Medication side effects: MEDICATION SIDE EFFECTS: no side effects reported  Benefit: Yes / No: Yes       ROS   A comprehensive review of systems was negative. Except mentioned in HPI       MENTAL STATUS EXAM   Vitals: BP (!) 141/83 (BP Location: Right arm, Patient Position: Standing)   Pulse 95   Temp 98.1  F (36.7  C) (Oral)   Resp 16   Wt 94.9 kg (209 lb 3.2 oz)   SpO2 96%   BMI 28.37 kg/m      Appearance:  Neatly groomed  Mood:  \"Ready\"  Affect: full range  was congruent to speech, mood congruent, intensity is normal and reactive  Suicidal Ideation: PRESENT / ABSENT: absent   Homicidal Ideation: PRESENT / ABSENT: absent   Thought process: logical and organized   Thought content: denies suicidal ideation, violent ideation, delusions, magical thinking and paranoid ideation.   Fund of Knowledge: Average  Attention/Concentration: Normal  Language ability:  Intact  Memory:  Immediate recall intact, Short-term memory intact and Long-term memory intact  Insight:  fair.  Judgement: fair  Orientation: Yes, x4  Psychomotor Behavior: denies tardive dyskinesia, dystonia or tics    Muscle Strength and Tone: MuscleStrength: Normal  Gait and Station: Normal       LABS   personally reviewed.     No results found for: PHENYTOIN, PHENOBARB, VALPROATE, CBMZ       DIAGNOSIS   Principal Problem:    Bipolar I disorder, current episode mixed    Active Problem " List:  Patient Active Problem List   Diagnosis     Other acne     CARDIOVASCULAR SCREENING; LDL GOAL LESS THAN 160     Psychosis (H)          PLAN   1. Ongoing education given regarding diagnostic and treatment options with risks, benefits and alternatives and adequate verbalization of understanding.    2. Medication: Abilify 10 mg daily    3. Hospitalist consult: Hospitalist to see patient as needed    4. Legal: 72 hour hold    5.  to follow regarding collecting and reviewing collateral information, referrals and disposition planning.        Risk Assessment: [facility] RISK ASSESSMENT: Patient able to contract for safety and Patient on precautions    Coordination of Care:   Treatment Plan reviewed and physician signed, Care discussed with Care/Treatment Team Members, Chart reviewed and Patient seen      Re-Certification I certify that the inpatient psychiatric facility services furnished since the previous certification were, and continue to be, medically necessary for, either, treatment which could reasonably be expected to improve the patient s condition or diagnostic study and that the hospital records indicate that the services furnished were, either, intensive treatment services, admission and related services necessary for diagnostic study, or equivalent services.     I certify that the patient continues to need, on a daily basis, active treatment furnished directly by or requiring the supervision of inpatient psychiatric facility personnel.   I estimate 1-2 days of hospitalization is necessary for proper treatment of the patient. My plans for post-hospital care for this patient are  home with self-care     ANDREW Johansen CNP    -     11/22/2021     -     2:53 PM    Total time  25 minutes with > 50%spent on coordination of cares and psycho-education.    This note was created with help of Dragon dictation system. Grammatical / typing errors are not intentional.    ANDREW Johansen  CNP

## 2021-11-22 NOTE — PLAN OF CARE
BEHAVIORAL TEAM DISCUSSION    Participants: Charge Nurse: QUAN, Psychiatrist: ASHLEY, Occupational Therapy: AM, Pharmacy: [facility], Psychology: [place], Clinical Treatment Coordinator: YENIFER  Progress: yes  Anticipated length of stay: 11/23  Continued Stay Criteria/Rationale: stabilization  Medical/Physical: none noted  Precautions:   Behavioral Orders   Procedures     Assault precautions     Cheeking Precautions (behavioral units)     Code 1 - Restrict to Unit     Elopement precautions     Routine Programming     As clinically indicated     Status 15     Every 15 minutes.     Suicide precautions     Patients on Suicide Precautions should have a Combination Diet ordered that includes a Diet selection(s) AND a Behavioral Tray selection for Safe Tray - with utensils, or Safe Tray - NO utensils       Plan: discharge tomorrow  Rationale for change in precautions or plan: new admission

## 2021-11-23 VITALS
RESPIRATION RATE: 16 BRPM | SYSTOLIC BLOOD PRESSURE: 141 MMHG | DIASTOLIC BLOOD PRESSURE: 83 MMHG | WEIGHT: 207.5 LBS | OXYGEN SATURATION: 100 % | BODY MASS INDEX: 28.14 KG/M2 | HEART RATE: 95 BPM | TEMPERATURE: 98.1 F

## 2021-11-23 PROCEDURE — 250N000013 HC RX MED GY IP 250 OP 250 PS 637: Performed by: PSYCHIATRY & NEUROLOGY

## 2021-11-23 PROCEDURE — 250N000013 HC RX MED GY IP 250 OP 250 PS 637: Performed by: NURSE PRACTITIONER

## 2021-11-23 PROCEDURE — 99239 HOSP IP/OBS DSCHRG MGMT >30: CPT | Performed by: NURSE PRACTITIONER

## 2021-11-23 RX ORDER — CARBOXYMETHYLCELLULOSE SODIUM 5 MG/ML
1 SOLUTION/ DROPS OPHTHALMIC 4 TIMES DAILY PRN
COMMUNITY
Start: 2021-11-23

## 2021-11-23 RX ORDER — ARIPIPRAZOLE 10 MG/1
10 TABLET ORAL DAILY
Qty: 30 TABLET | Refills: 0 | Status: SHIPPED | OUTPATIENT
Start: 2021-11-23

## 2021-11-23 RX ADMIN — Medication 1 PATCH: at 08:13

## 2021-11-23 RX ADMIN — ARIPIPRAZOLE 10 MG: 10 TABLET ORAL at 08:13

## 2021-11-23 ASSESSMENT — ACTIVITIES OF DAILY LIVING (ADL)
DRESS: INDEPENDENT
ORAL_HYGIENE: INDEPENDENT
HYGIENE/GROOMING: INDEPENDENT

## 2021-11-23 NOTE — PLAN OF CARE
Problem: Behavioral Health Plan of Care  Goal: Patient-Specific Goal (Individualization)  11/23/2021 1000 by Ally Holbrook RN  Outcome: Completed.  Patient discharged from the unit in a stable condition.  Writer explained discharged instructions, odered medications to pt.  Belongings list reviewed with pt by Behavior Tech. Gave him all medications brought with him on admission.  Patient signed AVS form and belongings checklist.

## 2021-11-23 NOTE — PROGRESS NOTES
11/23/21 1128   Engagement   Intervention Group   Topic Detail OT Creative Expressions group-Tile boxes day 2-for social engagement, focus, problem solving, creativity, healthy distraction, and symptom management   Attendance Attended   Patient Response Demonstrated understanding of materials provided;Expressed feelings/issues;Was respectful;Positive attitude   Concentrated on Task 30 - 45 min   Cognition Goal-directed;Sequences task;Attends to detail;Follows through with task   Mood/Affect Pleasant;Content   Social/Behavioral Cooperative;Engaged   Goals addressed in session today pt actively engaged in group activity. pt completed tile box design and was able to do a gloss coat to finish his project prior to discharge. pt remained in group space and engaged in conversation with peers.

## 2021-11-23 NOTE — DISCHARGE INSTRUCTIONS
Behavioral Discharge Planning and Instructions    Summary: You were admitted on 11/19/2021 due to Psychotic Symptomology.  You were treated by ANDREW Johansen CNP and discharged on 11/23/2021 from [facility] to Home.    Main Diagnosis: Psychosis (H)    Health Care Follow-up:     Patient has a previously-scheduled psychiatric appointment with Dr. Selvin Watts with the [facility] on 12/2/21 at 1:30pm    Major Treatments, Procedures and Findings:  You were provided with: a psychiatric assessment, assessed for medical stability, medication evaluation and/or management, group therapy, milieu management and medical interventions    Symptoms to Report: feeling more aggressive, increased confusion, losing more sleep, mood getting worse or thoughts of suicide    Early warning signs can include: increased depression or anxiety sleep disturbances increased thoughts or behaviors of suicide or self-harm  increased unusual thinking, such as paranoia or hearing voices    Safety and Wellness:  Take all medicines as directed.  Make no changes unless your doctor suggests them.    Follow treatment recommendations.  Refrain from alcohol and non-prescribed drugs.  If there is a concern for safety, call 911.    Resources:   Crisis Intervention: 152.395.5834 or 257-403-9514 (TTY: 666.813.8951).  Call anytime for help.  National [place] on Mental Illness (www.mn.yeison.org): 526.415.4131 or 407-770-3908.  Alcoholics Anonymous (www.alcoholics-anonymous.org): Check your phone book for your local chapter.  Suicide Awareness Voices of Education (SAVE) (www.save.org): 473-215-VWRU (7983)  National Suicide Prevention Line (www.mentalhealthmn.org): 224-227-WHKR (1977)  Mental Health Consumer/Survivor Network of [place] (www.mhcsn.net): 139.965.4561 or 917-757-2167  Mental Health Association of [place] (www.mentalhealth.org): 474.955.8747 or 057-025-6171  Self- Management and Recovery Training., SMART-- Toll free:  "689.133.6606  www.VULCUN  [facility] Crisis (COPE) Response - Adult 701 429-8692  Text 4 Life: txt \"LIFE\" to 56630 for immediate support and crisis intervention    General Medication Instructions:   See your medication sheet(s) for instructions.   Take all medicines as directed.  Make no changes unless your doctor suggests them.   Go to all your doctor visits.  Be sure to have all your required lab tests. This way, your medicines can be refilled on time.  Do not use any drugs not prescribed by your doctor.  Avoid alcohol.    Advance Directives:   Scanned document on file with [facility]? No scanned doc  Is document scanned? Pt states no documents  Honoring Choices Your Rights Handout: Informed and given  Was more information offered? Pt declined    The Treatment team has appreciated the opportunity to work with you. If you have any questions or concerns about your recent admission, you can contact the unit which can receive your call 24 hours a day, 7 days a week. They will be able to get in touch with a Provider if needed. The unit number is [phone].  "

## 2021-11-23 NOTE — PLAN OF CARE
Problem: Behavioral Health Plan of Care  Goal: Optimized Coping Skills in Response to Life Stressors  Outcome: Improving  Goal: Develops/Participates in Therapeutic [place] to Support Successful Transition  Outcome: Improving   Pt was visible on unit, pleasant, and cooperative with care. Pt socialized with staff and peers appropriately. Denied SI/HI, Hallucinations/delusions. Rated anxiety 2/10  and depression 4/10, stated is due to hospitalization. Covid test done for shift.  Pt is requesting double food portion with meals.  Pt was approached for vitals when already in bed, and pt refused. Pt went to bed 7pm and remained sleeping till end of shift. Denied pain. Med compliant and contracted for safety. Will continue to evaluate.

## 2021-11-23 NOTE — PLAN OF CARE
Problem: Suicidal Behavior  Goal: Suicidal Behavior is Absent or Managed  Outcome: Improving  Flowsheets (Taken 11/23/2021 0042)  Mutually Determined Action Steps (Suicidal Behavior Absent/Managed): verbalizes safety check rationale   Pt is on every 15 minutes safety checks per protocol. No safety concerns, pt slept well this night. No sob. Noted respirations even and unlabored. Pt does not appears to be in any acute distress. Pt denies pain, SI/HI and all other psych symptoms. Pt remains on assault, cheeking and elopement precaution. Above stated behavior was not noted on this shift.

## 2021-11-23 NOTE — PLAN OF CARE
Problem: Coping with Symptoms  Goal: Identify Coping Skills  Description: Patient will identify 6 healthy coping skills to manage stress and reduce symptoms this reporting period    Outcome: Completed   Occupational Therapy Discharge Note    Patient Name:  Yaron DERAS Yaaluz maria    D: Refer to daily doc flowsheets for details of progress toward goals.  A: Improvement seen in symptom management and engagement.   P: Patient discharging to home. Discontinue OT Care Plan goals and interventions.    Date: 11/23/2021  Time: 11:41 AM

## 2021-11-24 ENCOUNTER — PATIENT OUTREACH (OUTPATIENT)
Dept: CARE COORDINATION | Facility: CLINIC | Age: 31
End: 2021-11-24
Payer: OTHER GOVERNMENT

## 2021-11-24 DIAGNOSIS — Z71.89 OTHER SPECIFIED COUNSELING: ICD-10-CM

## 2021-11-24 NOTE — DISCHARGE SUMMARY
PSYCHIATRY  DISCHARGE SUMMARY     DATE OF DISCHARGE   11/23/2021           DISCHARGE DIAGNOSIS   Bipolar I disorder, current episode, Manic    Patient Active Problem List   Diagnosis     Other acne     CARDIOVASCULAR SCREENING; LDL GOAL LESS THAN 160     Psychosis (H)          REASON FOR ADMISSION   This is part of Myron Rogers CNP H&P note  This is a 31 year old male with history notable for bipolar disorder who presented to the ED via EMS due to concerns related to altered mental status. Current legal status is 72 hour hold. 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 Care coordinations performed in details includes obtaining collateral information from [facility] and care everywhere records. In brief, Patient reportedly took some pills while hanging out with a 'new friend' at a party and became manic, agitated and confused. Police called as he continued to be increasingly confused and agitated. Patient reportedly became more aggressive when the EMS arrived which prompted administration of ketamine as he was restrained and brought to the ER.      Patient was seen and evaluated in the consult room by himself. Patient presented as somewhat confused with occasional distractibility and poverty of speech. Patient stated he does not remember how he got to the hospital, requesting to be discharged, saying he does not need to be kept in the hospital for further observation. Patient stated he went to visit a new friend at home. He reported he and this new friend smoked weed together and realized he was unable to leave his friend's place thereafter as he kept coming back to his friend's garage whenever he made the move to leave. When writer asked if patient ever went to a party with his new friend of which he said no, saying he was just at the  friend's place smoking marijuana. Writer inquired if patient remembered brandishing knife when the the EMS arrived, of which he said probably. Patient stated he carries knife around just for self-defence. Patient vehemently denied ever being at any party or taking pills at the party. Patient currently denies suicidal and homicidal thoughts. Patient also denies auditory and visual hallucinations. Apart from occasional forgetfulness and struggling to gain insight, no overt psychosis noted during the assessment.     Per chart review, he sees psychiatrist and therapist via [facility] for the management of his mental health conditions. Care everywhere does not indicate any recent  Psychiatric hospitalizations. Records show patient has been on Abilify 10 mg trial for Bipolar which he reports he does not take regularly. Patient also reported taking Adderall to help with concentration at work. Per chart review, he does not have prescription for adderall as he only buys it from people. Patient denies CD dependency treatment. Though his UDS positive for amphetamine, this is because he takes adderall to help with concentration. Patient was started with a low dose of Abilify 5 mg to help with irritability and any residual psychosis.            HOSPITAL COURSE   Admitted due to aforementioned presentation.  Education regarding diagnostic and treatment options with risks, benefits and alternatives and adequate verbalization of understanding.  Discussed reviewed in further detail, stressors and events leading to presentation.    The multidisciplinary treatment team met (RN, OT, , Physician)  to discuss patient care and treatment planning. The psychiatric inpatient setting provided close nursing supervision and access to multiple treatment modalities and programming (group therapy, OT, one-to-one therapy.) Patient support systems such as family, , and other care providers were contacted as  "appropriate for collateral information and treatment planning.    Patient restarted on his home medication which is Abilify 10 mg daily. Patient was initially irritable on the unit as he only stayed in his room and not coming out for any programming. Due to patient's recent history of aggressive behavior with the police prior being brought to the ER, he was transferred to [address] [place] [address] as he met the criteria for patient with extreme agitation and psychosis. Patient reported symptoms improvement after 2 nights in the hospital. Patient told writer all he could remember was that he smoked Marijuana which he believed must have been laced with another powerful substance which got him confused and manic. Patient started to attend group in the unit and socialized with select peers. Of note, he complained of sore throat and dry eyes. Patient was treated with Cepacol and refresh for sore throat and dry eyes respectively. Patient was also swab for covid 19 to rule out corona virus. Covid result came back negative. Patient reported symptoms reduction as he was no longer manic of psychotic. Patient assessed to be ready for discharge as he was in no imminent danger to self or other and appropriate for an outpatient level of care. Patient discharged after the expiration of his 72 hour hold.     At the time of discharge, there appeared to be no evidence that the patient posed an imminent threat to self or others and a reasonable discharge plan was in place, we determined that the patient had achieved optimal medical benefit from hospitalization and was discharged with outpatient support.       MENTAL STATUS EXAM   Vitals: BP (!) 141/83 (BP Location: Right arm, Patient Position: Standing)   Pulse 95   Temp 98.1  F (36.7  C)   Resp 16   Wt 94.1 kg (207 lb 8 oz)   SpO2 100%   BMI 28.14 kg/m      Mental Status:  Appearance:  Neatly groomed  Mood:  \"Excited about leaving  Affect: full range was was congruent to speech, intensity is " normal and reactive  Suicidal Ideation: PRESENT / ABSENT: absent   Homicidal Ideation: PRESENT / ABSENT: absent   Thought process: organized   Thought content: denies suicidal ideation, violent ideation, delusions, magical thinking, paranoid ideation and homicidal ideation.   Fund of Knowledge: Average  Attention/Concentration: Fair  Language ability:  Intact  Memory:  Immediate recall intact, Short-term memory intact and Long-term memory intact  Insight:  fair.  Judgement: fair  Orientation: Yes, x4  Psychomotor Behavior: denies tardive dyskinesia, dystonia or tics    Muscle Strength and Tone: MuscleStrength: Normal  Gait and Station: Normal         DISCHARGE MEDICATIONS   Discharge Medication Options:   Discharge Medication List as of 11/23/2021 11:26 AM      START taking these medications    Details   carboxymethylcellulose PF (REFRESH PLUS) 0.5 % ophthalmic solution Place 1 drop into both eyes 4 times daily as needed for dry eyes, OTC         CONTINUE these medications which have CHANGED    Details   ARIPiprazole (ABILIFY) 10 MG tablet Take 1 tablet (10 mg) by mouth daily, Disp-30 tablet, R-0, E-Prescribe         CONTINUE these medications which have NOT CHANGED    Details   Cholecalciferol (VITAMIN D3 PO) Take by mouth daily, Historical      Pediatric Multivit-Minerals-C (RA GUMMY VITAMINS & MINERALS PO) Take 1 tablet by mouth daily, Historical         STOP taking these medications       Amphetamine-Dextroamphetamine (AMPHETAMINE-DEXTROAMPHET ER PO) Comments:   Reason for Stopping:               Medication adherence issues: MS Med Adherence Y/N: No  Medication side effects: MEDICATION SIDE EFFECTS: no side effects reported         DISCHARGE PLAN   1.  Education given regarding diagnostic and treatment options with risks, benefits and alternatives with adequate verbalization of understanding.  2.  Discharge to Home with outpatient support.  Upon detailed review of risk factors, patient amenable for release.   3.   Continue aforementioned medications and associated medication changes with follow-up by outpatient mental health provider.  4.  Crisis management planning in place.    5.  Continue efforts for sobriety.    Nursing and  to review further discharge recommendations.     TOTAL TIME:  Greater than 30 minutes for discharge planning.    This note was created with help of Dragon dictation system. Grammatical / typing errors are not intentional.    ANDREW Johansen CNP

## 2021-11-26 NOTE — PROGRESS NOTES
Clinic Care Coordination Contact  [facility]/Voicemail       Clinical Data: Care Coordinator Outreach  Outreach attempted x 2.  Left message on patient's voicemail with call back information and requested return call.    Plan: Care Coordinator will do no further outreaches at this time.    AMOR Buckner  [phone]  [facility]